# Patient Record
Sex: MALE | Race: WHITE | NOT HISPANIC OR LATINO | Employment: OTHER | URBAN - METROPOLITAN AREA
[De-identification: names, ages, dates, MRNs, and addresses within clinical notes are randomized per-mention and may not be internally consistent; named-entity substitution may affect disease eponyms.]

---

## 2021-01-24 DIAGNOSIS — Z11.59 ENCOUNTER FOR SCREENING FOR OTHER VIRAL DISEASES: Primary | ICD-10-CM

## 2021-02-04 ENCOUNTER — ANESTHESIA EVENT (OUTPATIENT)
Dept: SURGERY | Facility: CLINIC | Age: 71
End: 2021-02-04
Payer: COMMERCIAL

## 2021-02-04 RX ORDER — SILDENAFIL CITRATE 20 MG/1
20 TABLET ORAL DAILY PRN
COMMUNITY

## 2021-02-04 RX ORDER — FINASTERIDE 1 MG/1
1 TABLET, FILM COATED ORAL DAILY
COMMUNITY

## 2021-02-04 RX ORDER — ROSUVASTATIN CALCIUM 10 MG/1
10 TABLET, COATED ORAL DAILY
COMMUNITY

## 2021-02-04 NOTE — ANESTHESIA PREPROCEDURE EVALUATION
Anesthesia Pre-Procedure Evaluation    Patient: Juan Morrissey   MRN: [id number] : 1950        Preoperative Diagnosis: Primary osteoarthritis of right hip [M16.11]   Procedure : Procedure(s):  RIGHT TOTAL HIP ARTHROPLASTY     No past medical history on file.   No past surgical history on file.   Not on File   Social History     Tobacco Use     Smoking status: Not on file   Substance Use Topics     Alcohol use: Not on file      Wt Readings from Last 1 Encounters:   No data found for Wt        Anesthesia Evaluation   Pt has had prior anesthetic. Type: General.    No history of anesthetic complications       ROS/MED HX  ENT/Pulmonary:       Neurologic:       Cardiovascular:     (+) Dyslipidemia -----    METS/Exercise Tolerance:     Hematologic:       Musculoskeletal:   (+) arthritis,     GI/Hepatic:     (+) GERD,     Renal/Genitourinary:       Endo: Comment: Psoriasis      Psychiatric/Substance Use:       Infectious Disease:       Malignancy:   (+) Malignancy (Hx of melanoma), History of Skin.    Other:            Physical Exam    Airway        Mallampati: I   TM distance: > 3 FB   Neck ROM: full   Mouth opening: > 3 cm    Respiratory Devices and Support         Dental  no notable dental history         Cardiovascular             Pulmonary                   OUTSIDE LABS:  CBC: No results found for: WBC, HGB, HCT, PLT  BMP: No results found for: NA, POTASSIUM, CHLORIDE, CO2, BUN, CR, GLC  COAGS: No results found for: PTT, INR, FIBR  POC: No results found for: BGM, HCG, HCGS  HEPATIC: No results found for: ALBUMIN, PROTTOTAL, ALT, AST, GGT, ALKPHOS, BILITOTAL, BILIDIRECT, KENTRELL  OTHER: No results found for: PH, LACT, A1C, JONI, PHOS, MAG, LIPASE, AMYLASE, TSH, T4, T3, CRP, SED    Anesthesia Plan    ASA Status:  2      Anesthesia Type: General              Consents    Anesthesia Plan(s) and associated risks, benefits, and realistic alternatives discussed. Questions answered and patient/representative(s) expressed  understanding.     - Discussed with:  Patient         Postoperative Care    Pain management: IV analgesics, Multi-modal analgesia.   PONV prophylaxis: Ondansetron (or other 5HT-3)     Comments:                Deven Ly MD

## 2021-02-04 NOTE — PROGRESS NOTES
PTA medications updated by Medication Scribe prior to surgery via phone call with patient      -LAST DOSES ENTERED BY NURSE-    Comments:    Medication history sources: Patient, Surescripts and H&P  Medication history source reliability: Good  Adherence assessment: N/A Not Observed    Significant changes made to the medication list:  Patient reports no longer taking the following meds (med scribe removed from PTA med list): Xiidra, Ibuprofen      Additional medication history information:   None        Prior to Admission medications    Medication Sig Last Dose Taking? Auth Provider   B Complex Vitamins (B COMPLEX PO) Take 1 tablet by mouth daily  2/3/2021 at AM Yes Reported, Patient   finasteride (PROPECIA) 1 MG tablet Take 1 mg by mouth daily  Yes Reported, Patient   Omega-3 Fatty Acids (OMEGA 3 PO) Take 1 Dose by mouth daily 2/2/2021 Yes Reported, Patient   rosuvastatin (CRESTOR) 10 MG tablet Take 10 mg by mouth daily  Yes Reported, Patient   sildenafil (REVATIO) 20 MG tablet Take 20 mg by mouth daily as needed  Yes Reported, Patient   VITAMIN D PO Take 1 Dose by mouth daily 2/2/2021 Yes Reported, Patient   VITAMIN E PO Take 1 tablet by mouth daily 2/3/2021 Yes Reported, Patient

## 2021-02-05 ENCOUNTER — APPOINTMENT (OUTPATIENT)
Dept: PHYSICAL THERAPY | Facility: CLINIC | Age: 71
End: 2021-02-05
Attending: ORTHOPAEDIC SURGERY
Payer: COMMERCIAL

## 2021-02-05 ENCOUNTER — APPOINTMENT (OUTPATIENT)
Dept: GENERAL RADIOLOGY | Facility: CLINIC | Age: 71
End: 2021-02-05
Attending: ORTHOPAEDIC SURGERY
Payer: COMMERCIAL

## 2021-02-05 ENCOUNTER — ANESTHESIA (OUTPATIENT)
Dept: SURGERY | Facility: CLINIC | Age: 71
End: 2021-02-05
Payer: COMMERCIAL

## 2021-02-05 ENCOUNTER — HOSPITAL ENCOUNTER (OUTPATIENT)
Facility: CLINIC | Age: 71
Discharge: HOME | End: 2021-02-06
Attending: ORTHOPAEDIC SURGERY | Admitting: ORTHOPAEDIC SURGERY
Payer: COMMERCIAL

## 2021-02-05 DIAGNOSIS — Z96.641 HISTORY OF TOTAL RIGHT HIP REPLACEMENT: Primary | ICD-10-CM

## 2021-02-05 PROBLEM — Z96.649 HISTORY OF TOTAL HIP REPLACEMENT: Status: ACTIVE | Noted: 2021-02-05

## 2021-02-05 LAB
ABO + RH BLD: NORMAL
ABO + RH BLD: NORMAL
BLD GP AB SCN SERPL QL: NORMAL
BLOOD BANK CMNT PATIENT-IMP: NORMAL
HGB BLD-MCNC: 15.7 G/DL (ref 13.3–17.7)
SPECIMEN EXP DATE BLD: NORMAL

## 2021-02-05 PROCEDURE — 999N000141 HC STATISTIC PRE-PROCEDURE NURSING ASSESSMENT: Performed by: ORTHOPAEDIC SURGERY

## 2021-02-05 PROCEDURE — 250N000011 HC RX IP 250 OP 636: Performed by: NURSE ANESTHETIST, CERTIFIED REGISTERED

## 2021-02-05 PROCEDURE — 93010 ELECTROCARDIOGRAM REPORT: CPT | Performed by: INTERNAL MEDICINE

## 2021-02-05 PROCEDURE — 250N000011 HC RX IP 250 OP 636: Performed by: ORTHOPAEDIC SURGERY

## 2021-02-05 PROCEDURE — 97530 THERAPEUTIC ACTIVITIES: CPT | Mod: GP

## 2021-02-05 PROCEDURE — 93005 ELECTROCARDIOGRAM TRACING: CPT

## 2021-02-05 PROCEDURE — 250N000009 HC RX 250: Performed by: ORTHOPAEDIC SURGERY

## 2021-02-05 PROCEDURE — C1713 ANCHOR/SCREW BN/BN,TIS/BN: HCPCS | Performed by: ORTHOPAEDIC SURGERY

## 2021-02-05 PROCEDURE — 999N000063 XR PELVIS PORT 1/2 VW

## 2021-02-05 PROCEDURE — 258N000003 HC RX IP 258 OP 636: Performed by: PHYSICIAN ASSISTANT

## 2021-02-05 PROCEDURE — 258N000003 HC RX IP 258 OP 636: Performed by: ANESTHESIOLOGY

## 2021-02-05 PROCEDURE — 258N000003 HC RX IP 258 OP 636: Performed by: ORTHOPAEDIC SURGERY

## 2021-02-05 PROCEDURE — 36415 COLL VENOUS BLD VENIPUNCTURE: CPT | Performed by: ANESTHESIOLOGY

## 2021-02-05 PROCEDURE — C1776 JOINT DEVICE (IMPLANTABLE): HCPCS | Performed by: ORTHOPAEDIC SURGERY

## 2021-02-05 PROCEDURE — 272N000001 HC OR GENERAL SUPPLY STERILE: Performed by: ORTHOPAEDIC SURGERY

## 2021-02-05 PROCEDURE — 258N000003 HC RX IP 258 OP 636: Performed by: NURSE ANESTHETIST, CERTIFIED REGISTERED

## 2021-02-05 PROCEDURE — 86850 RBC ANTIBODY SCREEN: CPT | Performed by: ANESTHESIOLOGY

## 2021-02-05 PROCEDURE — 360N000077 HC SURGERY LEVEL 4, PER MIN: Performed by: ORTHOPAEDIC SURGERY

## 2021-02-05 PROCEDURE — 86901 BLOOD TYPING SEROLOGIC RH(D): CPT | Performed by: ANESTHESIOLOGY

## 2021-02-05 PROCEDURE — 250N000009 HC RX 250: Performed by: ANESTHESIOLOGY

## 2021-02-05 PROCEDURE — 250N000025 HC SEVOFLURANE, PER MIN: Performed by: ORTHOPAEDIC SURGERY

## 2021-02-05 PROCEDURE — 97116 GAIT TRAINING THERAPY: CPT | Mod: GP

## 2021-02-05 PROCEDURE — 250N000011 HC RX IP 250 OP 636: Performed by: PHYSICIAN ASSISTANT

## 2021-02-05 PROCEDURE — 258N000001 HC RX 258: Performed by: ORTHOPAEDIC SURGERY

## 2021-02-05 PROCEDURE — 370N000017 HC ANESTHESIA TECHNICAL FEE, PER MIN: Performed by: ORTHOPAEDIC SURGERY

## 2021-02-05 PROCEDURE — 86900 BLOOD TYPING SEROLOGIC ABO: CPT | Performed by: ANESTHESIOLOGY

## 2021-02-05 PROCEDURE — 999N000054 HC STATISTIC EKG NON-CHARGEABLE

## 2021-02-05 PROCEDURE — 250N000013 HC RX MED GY IP 250 OP 250 PS 637: Performed by: PHYSICIAN ASSISTANT

## 2021-02-05 PROCEDURE — 250N000009 HC RX 250: Performed by: NURSE ANESTHETIST, CERTIFIED REGISTERED

## 2021-02-05 PROCEDURE — 710N000009 HC RECOVERY PHASE 1, LEVEL 1, PER MIN: Performed by: ORTHOPAEDIC SURGERY

## 2021-02-05 PROCEDURE — 85018 HEMOGLOBIN: CPT | Performed by: ANESTHESIOLOGY

## 2021-02-05 PROCEDURE — 97161 PT EVAL LOW COMPLEX 20 MIN: CPT | Mod: GP

## 2021-02-05 PROCEDURE — 250N000011 HC RX IP 250 OP 636: Performed by: ANESTHESIOLOGY

## 2021-02-05 DEVICE — PINNACLE HIP SOLUTIONS ALTRX POLYETHYLENE ACETABULAR LINER +4 NEUTRAL 36MM ID 54MM OD
Type: IMPLANTABLE DEVICE | Site: HIP | Status: FUNCTIONAL
Brand: PINNACLE ALTRX

## 2021-02-05 DEVICE — IMP APEX HOLE ELIMINATOR HIP DEPUY DURALOC 1246-03-000: Type: IMPLANTABLE DEVICE | Site: HIP | Status: FUNCTIONAL

## 2021-02-05 DEVICE — BIOLOX DELTA CERAMIC FEMORAL HEAD +5.0 36MM DIA 12/14 TAPER
Type: IMPLANTABLE DEVICE | Site: HIP | Status: FUNCTIONAL
Brand: BIOLOX DELTA

## 2021-02-05 DEVICE — PINNACLE CANCELLOUS BONE SCREW 6.5MM X 25MM
Type: IMPLANTABLE DEVICE | Site: HIP | Status: FUNCTIONAL
Brand: PINNACLE

## 2021-02-05 DEVICE — PINNACLE POROCOAT ACETABULAR SHELL SECTOR II 54MM OD
Type: IMPLANTABLE DEVICE | Site: HIP | Status: FUNCTIONAL
Brand: PINNACLE POROCOAT

## 2021-02-05 DEVICE — TRI-LOCK BPS FEMORAL STEM 12/14 TAPER TRI-LOCK BPS W/GRIPTION SIZE 6 STD 107MM
Type: IMPLANTABLE DEVICE | Site: HIP | Status: FUNCTIONAL
Brand: TRI-LOCK GRIPTION

## 2021-02-05 DEVICE — PINNACLE CANCELLOUS BONE SCREW 6.5MM X 20MM
Type: IMPLANTABLE DEVICE | Site: HIP | Status: FUNCTIONAL
Brand: PINNACLE

## 2021-02-05 RX ORDER — FENTANYL CITRATE 50 UG/ML
INJECTION, SOLUTION INTRAMUSCULAR; INTRAVENOUS PRN
Status: DISCONTINUED | OUTPATIENT
Start: 2021-02-05 | End: 2021-02-05

## 2021-02-05 RX ORDER — AMOXICILLIN 250 MG
1-2 CAPSULE ORAL 2 TIMES DAILY
Qty: 30 TABLET | Refills: 0 | Status: SHIPPED | OUTPATIENT
Start: 2021-02-05 | End: 2021-08-25

## 2021-02-05 RX ORDER — OXYCODONE HYDROCHLORIDE 5 MG/1
5 TABLET ORAL
Status: DISCONTINUED | OUTPATIENT
Start: 2021-02-05 | End: 2021-02-06 | Stop reason: HOSPADM

## 2021-02-05 RX ORDER — NALOXONE HYDROCHLORIDE 0.4 MG/ML
0.4 INJECTION, SOLUTION INTRAMUSCULAR; INTRAVENOUS; SUBCUTANEOUS
Status: DISCONTINUED | OUTPATIENT
Start: 2021-02-05 | End: 2021-02-06 | Stop reason: HOSPADM

## 2021-02-05 RX ORDER — HYDROMORPHONE HYDROCHLORIDE 1 MG/ML
.3-.5 INJECTION, SOLUTION INTRAMUSCULAR; INTRAVENOUS; SUBCUTANEOUS EVERY 5 MIN PRN
Status: DISCONTINUED | OUTPATIENT
Start: 2021-02-05 | End: 2021-02-05 | Stop reason: HOSPADM

## 2021-02-05 RX ORDER — NALOXONE HYDROCHLORIDE 0.4 MG/ML
0.2 INJECTION, SOLUTION INTRAMUSCULAR; INTRAVENOUS; SUBCUTANEOUS
Status: DISCONTINUED | OUTPATIENT
Start: 2021-02-05 | End: 2021-02-06 | Stop reason: HOSPADM

## 2021-02-05 RX ORDER — ONDANSETRON 2 MG/ML
4 INJECTION INTRAMUSCULAR; INTRAVENOUS EVERY 6 HOURS PRN
Status: DISCONTINUED | OUTPATIENT
Start: 2021-02-05 | End: 2021-02-06 | Stop reason: HOSPADM

## 2021-02-05 RX ORDER — PROPOFOL 10 MG/ML
INJECTION, EMULSION INTRAVENOUS PRN
Status: DISCONTINUED | OUTPATIENT
Start: 2021-02-05 | End: 2021-02-05

## 2021-02-05 RX ORDER — SODIUM CHLORIDE, SODIUM LACTATE, POTASSIUM CHLORIDE, CALCIUM CHLORIDE 600; 310; 30; 20 MG/100ML; MG/100ML; MG/100ML; MG/100ML
INJECTION, SOLUTION INTRAVENOUS CONTINUOUS
Status: DISCONTINUED | OUTPATIENT
Start: 2021-02-05 | End: 2021-02-05 | Stop reason: HOSPADM

## 2021-02-05 RX ORDER — ONDANSETRON 4 MG/1
4 TABLET, ORALLY DISINTEGRATING ORAL EVERY 6 HOURS PRN
Status: DISCONTINUED | OUTPATIENT
Start: 2021-02-05 | End: 2021-02-06 | Stop reason: HOSPADM

## 2021-02-05 RX ORDER — HYDROXYZINE HYDROCHLORIDE 10 MG/1
10 TABLET, FILM COATED ORAL EVERY 6 HOURS PRN
Qty: 30 TABLET | Refills: 0 | Status: SHIPPED | OUTPATIENT
Start: 2021-02-05

## 2021-02-05 RX ORDER — SILDENAFIL CITRATE 20 MG/1
20 TABLET ORAL 2 TIMES DAILY
Status: DISCONTINUED | OUTPATIENT
Start: 2021-02-05 | End: 2021-02-05 | Stop reason: CLARIF

## 2021-02-05 RX ORDER — NEOSTIGMINE METHYLSULFATE 1 MG/ML
VIAL (ML) INJECTION PRN
Status: DISCONTINUED | OUTPATIENT
Start: 2021-02-05 | End: 2021-02-05

## 2021-02-05 RX ORDER — HYDROXYZINE HYDROCHLORIDE 50 MG/ML
50 INJECTION, SOLUTION INTRAMUSCULAR ONCE
Status: COMPLETED | OUTPATIENT
Start: 2021-02-05 | End: 2021-02-05

## 2021-02-05 RX ORDER — LIDOCAINE HYDROCHLORIDE 20 MG/ML
INJECTION, SOLUTION INFILTRATION; PERINEURAL PRN
Status: DISCONTINUED | OUTPATIENT
Start: 2021-02-05 | End: 2021-02-05

## 2021-02-05 RX ORDER — HYDROMORPHONE HYDROCHLORIDE 1 MG/ML
0.4 INJECTION, SOLUTION INTRAMUSCULAR; INTRAVENOUS; SUBCUTANEOUS
Status: DISCONTINUED | OUTPATIENT
Start: 2021-02-05 | End: 2021-02-06 | Stop reason: HOSPADM

## 2021-02-05 RX ORDER — GLYCOPYRROLATE 0.2 MG/ML
INJECTION, SOLUTION INTRAMUSCULAR; INTRAVENOUS PRN
Status: DISCONTINUED | OUTPATIENT
Start: 2021-02-05 | End: 2021-02-05

## 2021-02-05 RX ORDER — LIDOCAINE 40 MG/G
CREAM TOPICAL
Status: DISCONTINUED | OUTPATIENT
Start: 2021-02-05 | End: 2021-02-06 | Stop reason: HOSPADM

## 2021-02-05 RX ORDER — ONDANSETRON 2 MG/ML
INJECTION INTRAMUSCULAR; INTRAVENOUS PRN
Status: DISCONTINUED | OUTPATIENT
Start: 2021-02-05 | End: 2021-02-05

## 2021-02-05 RX ORDER — AMOXICILLIN 250 MG
1 CAPSULE ORAL 2 TIMES DAILY
Status: DISCONTINUED | OUTPATIENT
Start: 2021-02-05 | End: 2021-02-06 | Stop reason: HOSPADM

## 2021-02-05 RX ORDER — CEFAZOLIN SODIUM 2 G/100ML
2 INJECTION, SOLUTION INTRAVENOUS EVERY 8 HOURS
Status: COMPLETED | OUTPATIENT
Start: 2021-02-05 | End: 2021-02-06

## 2021-02-05 RX ORDER — ACETAMINOPHEN 325 MG/1
975 TABLET ORAL EVERY 8 HOURS
Status: DISCONTINUED | OUTPATIENT
Start: 2021-02-05 | End: 2021-02-06 | Stop reason: HOSPADM

## 2021-02-05 RX ORDER — OXYCODONE HYDROCHLORIDE 5 MG/1
5-10 TABLET ORAL
Qty: 30 TABLET | Refills: 0 | Status: SHIPPED | OUTPATIENT
Start: 2021-02-05 | End: 2021-08-25

## 2021-02-05 RX ORDER — ONDANSETRON 2 MG/ML
4 INJECTION INTRAMUSCULAR; INTRAVENOUS EVERY 30 MIN PRN
Status: DISCONTINUED | OUTPATIENT
Start: 2021-02-05 | End: 2021-02-05 | Stop reason: HOSPADM

## 2021-02-05 RX ORDER — ACETAMINOPHEN 325 MG/1
650 TABLET ORAL EVERY 4 HOURS PRN
Qty: 100 TABLET | Refills: 0 | Status: SHIPPED | OUTPATIENT
Start: 2021-02-05 | End: 2021-08-25

## 2021-02-05 RX ORDER — BISACODYL 10 MG
10 SUPPOSITORY, RECTAL RECTAL DAILY PRN
Status: DISCONTINUED | OUTPATIENT
Start: 2021-02-05 | End: 2021-02-06 | Stop reason: HOSPADM

## 2021-02-05 RX ORDER — ONDANSETRON 4 MG/1
4 TABLET, ORALLY DISINTEGRATING ORAL EVERY 30 MIN PRN
Status: DISCONTINUED | OUTPATIENT
Start: 2021-02-05 | End: 2021-02-05 | Stop reason: HOSPADM

## 2021-02-05 RX ORDER — FENTANYL CITRATE 50 UG/ML
25-50 INJECTION, SOLUTION INTRAMUSCULAR; INTRAVENOUS
Status: DISCONTINUED | OUTPATIENT
Start: 2021-02-05 | End: 2021-02-05 | Stop reason: HOSPADM

## 2021-02-05 RX ORDER — CEFAZOLIN SODIUM 2 G/100ML
2 INJECTION, SOLUTION INTRAVENOUS
Status: COMPLETED | OUTPATIENT
Start: 2021-02-05 | End: 2021-02-05

## 2021-02-05 RX ORDER — ROSUVASTATIN CALCIUM 10 MG/1
10 TABLET, COATED ORAL DAILY
Status: DISCONTINUED | OUTPATIENT
Start: 2021-02-05 | End: 2021-02-06 | Stop reason: HOSPADM

## 2021-02-05 RX ORDER — DOCUSATE SODIUM 100 MG/1
100 CAPSULE, LIQUID FILLED ORAL 2 TIMES DAILY
Status: DISCONTINUED | OUTPATIENT
Start: 2021-02-05 | End: 2021-02-06 | Stop reason: HOSPADM

## 2021-02-05 RX ORDER — PROPOFOL 10 MG/ML
INJECTION, EMULSION INTRAVENOUS CONTINUOUS PRN
Status: DISCONTINUED | OUTPATIENT
Start: 2021-02-05 | End: 2021-02-05

## 2021-02-05 RX ORDER — ACETAMINOPHEN 325 MG/1
650 TABLET ORAL EVERY 4 HOURS PRN
Status: DISCONTINUED | OUTPATIENT
Start: 2021-02-08 | End: 2021-02-06 | Stop reason: HOSPADM

## 2021-02-05 RX ORDER — CEFAZOLIN SODIUM 1 G/3ML
1 INJECTION, POWDER, FOR SOLUTION INTRAMUSCULAR; INTRAVENOUS SEE ADMIN INSTRUCTIONS
Status: DISCONTINUED | OUTPATIENT
Start: 2021-02-05 | End: 2021-02-05 | Stop reason: HOSPADM

## 2021-02-05 RX ORDER — KETOROLAC TROMETHAMINE 30 MG/ML
30 INJECTION, SOLUTION INTRAMUSCULAR; INTRAVENOUS ONCE
Status: COMPLETED | OUTPATIENT
Start: 2021-02-05 | End: 2021-02-05

## 2021-02-05 RX ORDER — POLYETHYLENE GLYCOL 3350 17 G/17G
17 POWDER, FOR SOLUTION ORAL DAILY
Status: DISCONTINUED | OUTPATIENT
Start: 2021-02-06 | End: 2021-02-06 | Stop reason: HOSPADM

## 2021-02-05 RX ORDER — SODIUM CHLORIDE, SODIUM LACTATE, POTASSIUM CHLORIDE, CALCIUM CHLORIDE 600; 310; 30; 20 MG/100ML; MG/100ML; MG/100ML; MG/100ML
INJECTION, SOLUTION INTRAVENOUS CONTINUOUS
Status: DISCONTINUED | OUTPATIENT
Start: 2021-02-05 | End: 2021-02-06 | Stop reason: HOSPADM

## 2021-02-05 RX ORDER — PROCHLORPERAZINE MALEATE 5 MG
5 TABLET ORAL EVERY 6 HOURS PRN
Status: DISCONTINUED | OUTPATIENT
Start: 2021-02-05 | End: 2021-02-06 | Stop reason: HOSPADM

## 2021-02-05 RX ORDER — HYDROMORPHONE HYDROCHLORIDE 1 MG/ML
0.2 INJECTION, SOLUTION INTRAMUSCULAR; INTRAVENOUS; SUBCUTANEOUS
Status: DISCONTINUED | OUTPATIENT
Start: 2021-02-05 | End: 2021-02-06 | Stop reason: HOSPADM

## 2021-02-05 RX ORDER — DEXAMETHASONE SODIUM PHOSPHATE 4 MG/ML
INJECTION, SOLUTION INTRA-ARTICULAR; INTRALESIONAL; INTRAMUSCULAR; INTRAVENOUS; SOFT TISSUE PRN
Status: DISCONTINUED | OUTPATIENT
Start: 2021-02-05 | End: 2021-02-05

## 2021-02-05 RX ORDER — OXYCODONE HYDROCHLORIDE 5 MG/1
10 TABLET ORAL EVERY 4 HOURS PRN
Status: DISCONTINUED | OUTPATIENT
Start: 2021-02-05 | End: 2021-02-06 | Stop reason: HOSPADM

## 2021-02-05 RX ORDER — EPHEDRINE SULFATE 50 MG/ML
INJECTION, SOLUTION INTRAMUSCULAR; INTRAVENOUS; SUBCUTANEOUS PRN
Status: DISCONTINUED | OUTPATIENT
Start: 2021-02-05 | End: 2021-02-05

## 2021-02-05 RX ORDER — HYDROXYZINE HYDROCHLORIDE 10 MG/1
10 TABLET, FILM COATED ORAL EVERY 6 HOURS PRN
Status: DISCONTINUED | OUTPATIENT
Start: 2021-02-05 | End: 2021-02-06 | Stop reason: HOSPADM

## 2021-02-05 RX ORDER — TRANEXAMIC ACID 650 MG/1
1950 TABLET ORAL ONCE
Status: COMPLETED | OUTPATIENT
Start: 2021-02-05 | End: 2021-02-05

## 2021-02-05 RX ORDER — MAGNESIUM HYDROXIDE 1200 MG/15ML
LIQUID ORAL PRN
Status: DISCONTINUED | OUTPATIENT
Start: 2021-02-05 | End: 2021-02-05 | Stop reason: HOSPADM

## 2021-02-05 RX ORDER — FINASTERIDE 1 MG/1
1 TABLET, FILM COATED ORAL DAILY
Status: DISCONTINUED | OUTPATIENT
Start: 2021-02-05 | End: 2021-02-05 | Stop reason: CLARIF

## 2021-02-05 RX ADMIN — CEFAZOLIN SODIUM 2 G: 2 INJECTION, SOLUTION INTRAVENOUS at 16:38

## 2021-02-05 RX ADMIN — DOCUSATE SODIUM 100 MG: 100 CAPSULE, LIQUID FILLED ORAL at 20:01

## 2021-02-05 RX ADMIN — SODIUM CHLORIDE, POTASSIUM CHLORIDE, SODIUM LACTATE AND CALCIUM CHLORIDE: 600; 310; 30; 20 INJECTION, SOLUTION INTRAVENOUS at 06:48

## 2021-02-05 RX ADMIN — HYDROMORPHONE HYDROCHLORIDE 0.5 MG: 1 INJECTION, SOLUTION INTRAMUSCULAR; INTRAVENOUS; SUBCUTANEOUS at 08:01

## 2021-02-05 RX ADMIN — CEFAZOLIN SODIUM 2 G: 2 INJECTION, SOLUTION INTRAVENOUS at 07:48

## 2021-02-05 RX ADMIN — SODIUM CHLORIDE, POTASSIUM CHLORIDE, SODIUM LACTATE AND CALCIUM CHLORIDE: 600; 310; 30; 20 INJECTION, SOLUTION INTRAVENOUS at 18:30

## 2021-02-05 RX ADMIN — DOCUSATE SODIUM 50 MG AND SENNOSIDES 8.6 MG 1 TABLET: 8.6; 5 TABLET, FILM COATED ORAL at 20:01

## 2021-02-05 RX ADMIN — HYDROMORPHONE HYDROCHLORIDE 0.5 MG: 1 INJECTION, SOLUTION INTRAMUSCULAR; INTRAVENOUS; SUBCUTANEOUS at 08:20

## 2021-02-05 RX ADMIN — OXYCODONE HYDROCHLORIDE 5 MG: 5 TABLET ORAL at 18:30

## 2021-02-05 RX ADMIN — OXYCODONE HYDROCHLORIDE 10 MG: 5 TABLET ORAL at 21:29

## 2021-02-05 RX ADMIN — PROPOFOL 200 MG: 10 INJECTION, EMULSION INTRAVENOUS at 07:36

## 2021-02-05 RX ADMIN — HYDROMORPHONE HYDROCHLORIDE 0.5 MG: 1 INJECTION, SOLUTION INTRAMUSCULAR; INTRAVENOUS; SUBCUTANEOUS at 11:07

## 2021-02-05 RX ADMIN — HYDROXYZINE HYDROCHLORIDE 50 MG: 50 INJECTION, SOLUTION INTRAMUSCULAR at 11:52

## 2021-02-05 RX ADMIN — ROCURONIUM BROMIDE 10 MG: 10 INJECTION INTRAVENOUS at 08:49

## 2021-02-05 RX ADMIN — NEOSTIGMINE METHYLSULFATE 5 MG: 1 INJECTION, SOLUTION INTRAVENOUS at 09:51

## 2021-02-05 RX ADMIN — HYDROMORPHONE HYDROCHLORIDE 0.5 MG: 1 INJECTION, SOLUTION INTRAMUSCULAR; INTRAVENOUS; SUBCUTANEOUS at 10:16

## 2021-02-05 RX ADMIN — DEXAMETHASONE SODIUM PHOSPHATE 4 MG: 4 INJECTION, SOLUTION INTRA-ARTICULAR; INTRALESIONAL; INTRAMUSCULAR; INTRAVENOUS; SOFT TISSUE at 07:51

## 2021-02-05 RX ADMIN — HYDROMORPHONE HYDROCHLORIDE 0.5 MG: 1 INJECTION, SOLUTION INTRAMUSCULAR; INTRAVENOUS; SUBCUTANEOUS at 11:35

## 2021-02-05 RX ADMIN — FENTANYL CITRATE 50 MCG: 50 INJECTION, SOLUTION INTRAMUSCULAR; INTRAVENOUS at 07:46

## 2021-02-05 RX ADMIN — LIDOCAINE HYDROCHLORIDE 100 MG: 20 INJECTION, SOLUTION INFILTRATION; PERINEURAL at 07:34

## 2021-02-05 RX ADMIN — CEFAZOLIN SODIUM 1 G: 2 INJECTION, SOLUTION INTRAVENOUS at 09:44

## 2021-02-05 RX ADMIN — ASPIRIN 325 MG: 325 TABLET, COATED ORAL at 16:38

## 2021-02-05 RX ADMIN — SODIUM CHLORIDE, POTASSIUM CHLORIDE, SODIUM LACTATE AND CALCIUM CHLORIDE: 600; 310; 30; 20 INJECTION, SOLUTION INTRAVENOUS at 08:43

## 2021-02-05 RX ADMIN — HYDROMORPHONE HYDROCHLORIDE 0.5 MG: 1 INJECTION, SOLUTION INTRAMUSCULAR; INTRAVENOUS; SUBCUTANEOUS at 10:52

## 2021-02-05 RX ADMIN — KETOROLAC TROMETHAMINE 30 MG: 30 INJECTION, SOLUTION INTRAMUSCULAR at 11:18

## 2021-02-05 RX ADMIN — SODIUM CHLORIDE, POTASSIUM CHLORIDE, SODIUM LACTATE AND CALCIUM CHLORIDE: 600; 310; 30; 20 INJECTION, SOLUTION INTRAVENOUS at 16:39

## 2021-02-05 RX ADMIN — Medication 5 MG: at 09:23

## 2021-02-05 RX ADMIN — GLYCOPYRROLATE 0.8 MG: 0.2 INJECTION, SOLUTION INTRAMUSCULAR; INTRAVENOUS at 09:51

## 2021-02-05 RX ADMIN — DEXMEDETOMIDINE HYDROCHLORIDE 20 MCG: 100 INJECTION, SOLUTION INTRAVENOUS at 08:27

## 2021-02-05 RX ADMIN — TRANEXAMIC ACID 1950 MG: 650 TABLET ORAL at 06:09

## 2021-02-05 RX ADMIN — PROPOFOL 30 MCG/KG/MIN: 10 INJECTION, EMULSION INTRAVENOUS at 08:06

## 2021-02-05 RX ADMIN — LIDOCAINE HYDROCHLORIDE: 10 INJECTION, SOLUTION EPIDURAL; INFILTRATION; INTRACAUDAL; PERINEURAL at 06:46

## 2021-02-05 RX ADMIN — ROCURONIUM BROMIDE 20 MG: 10 INJECTION INTRAVENOUS at 08:11

## 2021-02-05 RX ADMIN — ROCURONIUM BROMIDE 50 MG: 10 INJECTION INTRAVENOUS at 07:36

## 2021-02-05 RX ADMIN — ACETAMINOPHEN 975 MG: 325 TABLET, FILM COATED ORAL at 20:01

## 2021-02-05 RX ADMIN — ONDANSETRON 4 MG: 2 INJECTION INTRAMUSCULAR; INTRAVENOUS at 09:21

## 2021-02-05 RX ADMIN — MIDAZOLAM 2 MG: 1 INJECTION INTRAMUSCULAR; INTRAVENOUS at 07:28

## 2021-02-05 RX ADMIN — FENTANYL CITRATE 50 MCG: 50 INJECTION, SOLUTION INTRAMUSCULAR; INTRAVENOUS at 07:34

## 2021-02-05 RX ADMIN — Medication 5 MG: at 08:53

## 2021-02-05 RX ADMIN — FENTANYL CITRATE 50 MCG: 0.05 INJECTION, SOLUTION INTRAMUSCULAR; INTRAVENOUS at 10:29

## 2021-02-05 RX ADMIN — PHENYLEPHRINE HYDROCHLORIDE 100 MCG: 10 INJECTION INTRAVENOUS at 08:01

## 2021-02-05 ASSESSMENT — MIFFLIN-ST. JEOR: SCORE: 1687.98

## 2021-02-05 NOTE — OP NOTE
Procedure Date: 02/05/2021      PREOPERATIVE DIAGNOSIS:  Osteoarthritis of the right hip.      POSTOPERATIVE DIAGNOSIS:  Osteoarthritis of the right hip.      PROCEDURE PERFORMED:  Right total hip arthroplasty using the DePuy Tri-Lock stem size 6 standard offset and a 54 mm Sylvester cup with a +4 neutral liner for a 54 mm cup and a 36 mm head.  We used a +5, 36 mm ceramic head.      INDICATIONS FOR PROCEDURE:  The patient is a 70-year-old male with a long history of osteoarthritis of the right hip.  He had been getting injection treatments with good short-term relief; however, they became decreasingly effective.  I saw the patient in consultation at which time I discussed treatment options with him.  I explained to him the risks, benefits, potential complications of both operative and nonoperative treatment.  The discussion of surgery including but not specific to infection, vascular neurologic complications, DVT, leg length inequality, instability of the hip, fracture, septic and aseptic loosening, arthrofibrosis, heterotopic ossification, the possible need for further revision surgery.  After this discussion, the patient wanted to proceed with surgery.      ANESTHESIA:  General.      ASSISTANT:  KYLEE TALLEY PA-C      DESCRIPTION OF PROCEDURE:  The patient was taken to the operating room and placed on the operating table in the supine position.  After adequate induction of general anesthetic, he was transferred to lateral position and held this way with a [place] hip nicole, held with his right hip up.  Axillary roll was placed.  The patient was given 2 grams of Ancef for infection prophylaxis given 1 hour prior to incision.  We then performed sterile prep and drape of the right hip and right lower extremity.  After sterile prep and drape, an incision was made.  I proceeded with anterolateral approach to the hip.  I identified the abductors, took down the anterior one-third of the abductors, tagged and retracted  medially for later repair.  I then did a T capsulotomy, preserving the capsule for later repair.  We were then able to dislocate the hip.  Upon dislocation of the hip, we made appropriate femoral neck cut and applied bone wax to the cut surface of femur to diminish blood loss.  We then placed retractors anteriorly, superiorly and posteriorly to expose the acetabulum.  We then reamed to 53 mm and eventually put in a 54 mm cup.  Prior to placing the cup, we took reamings of the femoral head to use as bone graft.  Once the cup was fully seated, 3 screws were applied for additional fixation.  We then put in a manhole cover, irrigated with pulse jet lavage and put in the actual liner and a +4 neutral liner for a 54 mm cup and a 36 mm head.  We then directed our attention to the femur, used a broach-only technique, broached to a size 6 standard offset stem, reduced the hip and found it to be very stable in full extension, external rotation, flexion, adduction, internal rotation with restoration of leg length and offset.  We then obtained intraoperative AP pelvis x-ray, which demonstrated ideal position of the cup and screws, good fit on the femoral side with restoration of leg length and offset.  We then dislocated the hip, removed the trial components, irrigated with pulse jet lavage and put down the actual stem.  Once the stem was fully seated, we then applied the head and reduced the hip, again found it to be very stable with restoration of leg length and offset.  We then soaked the hip in a dilute solution of Betadine for 3 minutes and irrigated with pulse jet lavage, used approximately 3 liters of antibiotic saline and pulse jet lavage.  We then repaired the capsule using 0 Ethibond.  Abduction repaired using #2 Ethibond.  The fascial layer was closed using 0 Ethibond and was oversewn using 0 Stratafix.  The deep subcutaneous was closed using 0 Vicryl, superficial subcutaneous was closed with 2-0 Vicryl, skin was  closed with a running 3-0 Monocryl subcuticular stitch followed by Prineo dressing.  Sterile dressing was applied followed by abduction pillow.  The patient tolerated the operative procedure.  There were no intraoperative complications.  The patient went to PAR in stable condition.      PLAN:  Will be for the patient to get 24 hours of Ancef for infection prophylaxis, 30 days of aspirin for DVT prophylaxis.         RICARDA HERNANDEZ MD             D: 2021   T: 2021   MT:       Name:     ZAN GARRISON   MRN:      [id number]        Account:        [id number]   :      1950           Procedure Date: 2021      Document: [id number]

## 2021-02-05 NOTE — ANESTHESIA CARE TRANSFER NOTE
Patient: Juan Morrissey    Procedure(s):  RIGHT TOTAL HIP ARTHROPLASTY    Diagnosis: Primary osteoarthritis of right hip [M16.11]  Diagnosis Additional Information: No value filed.    Anesthesia Type:   General     Note:    Oropharynx: oropharynx clear of all foreign objects  Level of Consciousness: drowsy  Oxygen Supplementation: face mask    Independent Airway: airway patency satisfactory and stable  Dentition: dentition unchanged  Vital Signs Stable: post-procedure vital signs reviewed and stable  Report to RN Given: handoff report given  Patient transferred to: PACU    Handoff Report: Identifed the Patient, Identified the Reponsible Provider, Reviewed the pertinent medical history, Discussed the surgical course, Reviewed Intra-OP anesthesia mangement and issues during anesthesia, Set expectations for post-procedure period and Allowed opportunity for questions and acknowledgement of understanding      Vitals: (Last set prior to Anesthesia Care Transfer)  CRNA VITALS  2/5/2021 0932 - 2/5/2021 1008      2/5/2021             Pulse:  103    SpO2:  96 %    Resp Rate (observed):  10        Electronically Signed By: PAPI Banuelos CRNA  February 5, 2021  10:08 AM

## 2021-02-05 NOTE — PROGRESS NOTES
Patient vital signs are at baseline: Yes  Patient able to ambulate as they were prior to admission or with assist devices provided by therapies during their stay:  No,  Reason:  Pt is not OOB yet. Pt is at 1545.  Patient MUST void prior to discharge:  No,  Reason:  DTV  Patient able to tolerate oral intake:  Yes  Pain has adequate pain control using Oral analgesics:  Yes, Rates pain at 5 but says is comfortable. Refused pain medication.

## 2021-02-05 NOTE — BRIEF OP NOTE
[facility]    Brief Operative Note    Pre-operative diagnosis: Primary osteoarthritis of right hip [M16.11]  Post-operative diagnosis Same as pre-operative diagnosis    Procedure: Procedure(s):  RIGHT TOTAL HIP ARTHROPLASTY  Surgeon: Surgeon(s) and Role:     * Desi Cali MD - Primary     * Thomas Barrera PA-C - Assisting  Anesthesia: General   Estimated blood loss: Less than 200 ml  Drains: None  Specimens:   ID Type Source Tests Collected by Time Destination   1 : RIGHT HIP BONE Other (specify in comments) Other OR DOCUMENTATION ONLY Desi Cali MD 2/5/2021  8:14 AM      Findings:   None.  Complications: None.  Implants:   Implant Name Type Inv. Item Serial No.  Lot No. LRB No. Used Action   IMP CUP ACE PINNACLE 54MM 1217- Total Joint Component/Insert IMP CUP ACE PINNACLE 54MM 1217-  [id number] Right 1 Implanted   IMP APEX HOLE ELIMINATOR HIP DEPUY DURALOC 1246- Metallic Hardware/[place] IMP APEX HOLE ELIMINATOR HIP DEPUY DURALOC 1246-  [employer] [id number] Right 1 Implanted   IMP SCR BONE CAN ACE 6.5X25MM 1217- Metallic Hardware/[place] IMP SCR BONE CAN ACE 6.5X25MM 1217-  [employer] [facility]-C [id number] Right 1 Implanted   IMP SCR BONE CAN ACE 6.5X20MM 1217- Metallic Hardware/[place] IMP SCR BONE CAN ACE 6.5X20MM 1217-   [id number] Right 1 Implanted   IMP SCR BONE CAN ACE 6.5X25MM 1217- Metallic Hardware/[place] IMP SCR BONE CAN ACE 6.5X25MM 1217-  [employer] [facility]-C [id number] Right 1 Implanted   IMP LINER HIP DEPUY PINNACLE ALTRX [id number] +4 1221- Total Joint Component/Insert IMP LINER HIP DEPUY PINNACLE ALTRX [id number] +4 1221-  J [id number] Right 1 Implanted   STEM FEMUR TRI-LOCK BPS SZ6 SO Total Joint Component/Insert STEM FEMUR TRI-LOCK BPS SZ6 SO  J [id number] Right 1 Implanted   IMP HEAD FEMORAL DEPUY CERAMIC 36MM +5MM [id number] Total Joint Component/Insert IMP HEAD FEMORAL  DEPUY CERAMIC 36MM +5MM [id number]  [id number] Right 1 Implanted

## 2021-02-05 NOTE — ANESTHESIA PROCEDURE NOTES
Airway   Date/Time: 2/5/2021 7:38 AM   Patient location during procedure: OR  Staff -   Performed By: CRNA    Indications and Patient Condition  Indications for airway management: kirstin-procedural and airway protection  Induction type:intravenousMask difficulty assessment: 2 - vent by mask + OA or adjuvant +/- NMBA    Final Airway Details  Final airway type: endotracheal airway  Successful airway:ETT - single and Oral  Endotracheal Airway Details   ETT size (mm): 8.0  Cuffed: yes  Successful intubation technique: direct laryngoscopy  Grade View of Cords: 1  Adjucts: stylet  Measured from: lips  Secured at (cm): 23  Secured with: pink tape  Bite block used: None    Post intubation assessment   Placement verified by: capnometry   Number of attempts at approach: 1  Number of other approaches attempted: 0  Secured with:pink tape  Ease of procedure: easy  Dentition: Intact and Unchanged

## 2021-02-05 NOTE — DISCHARGE SUMMARY
Orthopedic Discharge Summary   Patient: Juan Morrissey  Admission Date: 2/5/2021  Discharge Date: 2/6/2021  Date of Service: 2/5/2021  Attending Provider: Desi Cali MD  Admission Diagnosis: Primary osteoarthritis of right hip [M16.11]  Discharge Diagnosis: right hip osteoarthritis  Procedures Performed: right total hip replacement  Complications: None apparent   History of Present Illness: see operative report for full HPI  Past Medical History:   Past Medical History:   Diagnosis Date     Arthritis      DJD (degenerative joint disease)      Elevated HDL      GERD (gastroesophageal reflux disease) 2017     Hyperlipidemia, mixed      Melanoma (H) 2017     Psoriasis      Rosacea      There is no problem list on file for this patient.    Past Surgical History:   Procedure Laterality Date     SOFT TISSUE SURGERY      skin graft at age 9     VASCULAR SURGERY      vein stripping     Social History     Socioeconomic History     Marital status:      Spouse name: Not on file     Number of children: Not on file     Years of education: Not on file     Highest education level: Not on file   Occupational History     Not on file   Social Needs     Financial resource strain: Not on file     Food insecurity     Worry: Not on file     Inability: Not on file     Transportation needs     Medical: Not on file     Non-medical: Not on file   Tobacco Use     Smoking status: Former Smoker     Smokeless tobacco: Never Used   Substance and Sexual Activity     Alcohol use: Yes     Comment: 2WINE,4 SHOTS OF LIQUOR/ WEEK     Drug use: Not on file     Sexual activity: Not on file   Lifestyle     Physical activity     Days per week: Not on file     Minutes per session: Not on file     Stress: Not on file   Relationships     Social connections     Talks on phone: Not on file     Gets together: Not on file     Attends Denominational service: Not on file     Active member of club or organization: Not on file     Attends meetings of clubs  or organizations: Not on file     Relationship status: Not on file     Intimate partner violence     Fear of current or ex partner: Not on file     Emotionally abused: Not on file     Physically abused: Not on file     Forced sexual activity: Not on file   Other Topics Concern     Parent/sibling w/ CABG, MI or angioplasty before 65F 55M? Not Asked   Social History Narrative     Not on file     Medications on admission:   Medications Prior to Admission   Medication Sig Dispense Refill Last Dose     B Complex Vitamins (B COMPLEX PO) Take 1 tablet by mouth daily    2/2/2021 at AM     finasteride (PROPECIA) 1 MG tablet Take 1 mg by mouth daily   2/4/2021 at am     Omega-3 Fatty Acids (OMEGA 3 PO) Take 1 Dose by mouth daily   2/2/2021     rosuvastatin (CRESTOR) 10 MG tablet Take 10 mg by mouth daily   2/4/2021 at am     sildenafil (REVATIO) 20 MG tablet Take 20 mg by mouth daily as needed   Past Week at Unknown time     VITAMIN D PO Take 1 Dose by mouth daily   2/2/2021     VITAMIN E PO Take 1 tablet by mouth daily   2/2/2021     Allergies:  No Known Allergies    Hospital Course: Patient was admitted to Orthopedics and brought to the OR on where he underwent the above named procedure. Postoperatively he did very well with no apparent complications, and he had an uneventful hospital stay. Antibiotics were given for 24 hours after surgery. He was given aspirin for DVT prophylaxis and his pain was well controlled with oral meds, then transitioned to oral pain medications during his hospital stay. He progressed well with physical therapy and discharge to home was recommended.   Consultations Obtained During Hospitalization:  2. Physical Therapy for gait training, mobilization, range of motion and strengthening exercises  3. Occupational Therapy for activities of daily living.  4. Social Work for disposition planning.  Active Problems:    * No active hospital problems. *    Discharge Disposition: patient improving  Discharge  Diet: resume normal pre op diet  Discharge Medications:   Current Discharge Medication List      START taking these medications    Details   acetaminophen (TYLENOL) 325 MG tablet Take 2 tablets (650 mg) by mouth every 4 hours as needed for other (mild pain)  Qty: 100 tablet, Refills: 0    Associated Diagnoses: History of total right hip replacement      aspirin (ASA) 325 MG EC tablet Take 1 tablet (325 mg) by mouth daily  Qty: 30 tablet, Refills: 0    Associated Diagnoses: History of total right hip replacement      hydrOXYzine (ATARAX) 10 MG tablet Take 1 tablet (10 mg) by mouth every 6 hours as needed for itching or anxiety (with pain, moderate pain)  Qty: 30 tablet, Refills: 0    Associated Diagnoses: History of total right hip replacement      oxyCODONE (ROXICODONE) 5 MG tablet Take 1-2 tablets (5-10 mg) by mouth every 3 hours as needed for pain (Moderate to Severe)  Qty: 30 tablet, Refills: 0    Associated Diagnoses: History of total right hip replacement      senna-docusate (SENOKOT-S/PERICOLACE) 8.6-50 MG tablet Take 1-2 tablets by mouth 2 times daily Take while on oral narcotics to prevent or treat constipation.  Qty: 30 tablet, Refills: 0    Comments: While taking narcotics  Associated Diagnoses: History of total right hip replacement         CONTINUE these medications which have NOT CHANGED    Details   B Complex Vitamins (B COMPLEX PO) Take 1 tablet by mouth daily       finasteride (PROPECIA) 1 MG tablet Take 1 mg by mouth daily      Omega-3 Fatty Acids (OMEGA 3 PO) Take 1 Dose by mouth daily      rosuvastatin (CRESTOR) 10 MG tablet Take 10 mg by mouth daily      sildenafil (REVATIO) 20 MG tablet Take 20 mg by mouth daily as needed      VITAMIN D PO Take 1 Dose by mouth daily      VITAMIN E PO Take 1 tablet by mouth daily           Code Status:   X-rays needed at the follow up visit:   Thomas Barrera PA-C  2/5/2021 10:18 AM   [facility]  [phone]

## 2021-02-05 NOTE — OR NURSING
MDA in - pain level a 7 - again uncomfortable - to try 0.5 Dilaudid - if no better result with pain level to give Vistaril 50 mg IM - will continue to monitor in PACU for now

## 2021-02-05 NOTE — PROGRESS NOTES
02/05/21 1623   Quick Adds   Type of Visit Initial PT Evaluation   Living Environment   People in home spouse   Current Living Arrangements condominium   Home Accessibility stairs within home;stairs to enter home   Number of Stairs, Main Entrance 4   Stair Railings, Main Entrance railings on both sides of stairs   Number of Stairs, Within Home, Primary   (15)   Stair Railings, Within Home, Primary railing on left side (ascending)   Transportation Anticipated car, drives self   Living Environment Comments pt has two story condo with bedroom on second floor and ability to get out of bed on left side in order to follow precautions   Self-Care   Usual Activity Tolerance good   Current Activity Tolerance good   Activity/Exercise/Self-Care Comment ice ritika   General Information   Onset of Illness/Injury or Date of Surgery 02/05/21   Referring Physician Desi Cali   Patient/Family Therapy Goals Statement (PT) ice amy   Pertinent History of Current Problem (include personal factors and/or comorbidities that impact the POC) R anteriolateral FRANSISCA 2/5/2021   Existing Precautions/Restrictions no active hip ABD;fall   Weight-Bearing Status - RLE weight-bearing as tolerated   Cognition   Orientation Status (Cognition) oriented x 4   Affect/Mental Status (Cognition) WNL   Follows Commands (Cognition) WNL   Pain Assessment   Patient Currently in Pain Yes, see Vital Sign flowsheet  (1/10 in bed 4/10 while walking)   Strength   Strength Comments WFL   Bed Mobility   Comment (Bed Mobility) independent with bed mobility and able to follow precautions   Transfers   Transfer Safety Comments SBA   Gait/Stairs (Locomotion)   [place] Level (Gait) supervision   Assistive Device (Gait) walker, front-wheeled   Distance in Feet (Required for LE Total Joints) 300ft   Pattern (Gait) step-through;3-point   Maintains Weight-bearing Status (Gait) able to maintain   Clinical Impression   Criteria for Skilled Therapeutic Intervention  yes, treatment indicated   PT Diagnosis (PT) impaired mobility   Influenced by the following impairments pain, right hip weakness, right hip decreased ROM., decreased activity tolerance   Functional limitations due to impairments R FRANSISCA 2/5/2021   Clinical Presentation Stable/Uncomplicated   Clinical Presentation Rationale Pt was alert and oriented and agreeable to particapte in PT with ability to follow commands and keep precautions while performing functional mobility with SBA   Clinical Decision Making (Complexity) low complexity   Therapy Frequency (PT) Other (see comments)  (1 follow up visit)   Predicted Duration of Therapy Intervention (days/wks) 2 days   Planned Therapy Interventions (PT) stair training;transfer training;gait training;patient/family education;home exercise program   Risk & Benefits of therapy have been explained evaluation/treatment results reviewed;care plan/treatment goals reviewed;risks/benefits reviewed;current/potential barriers reviewed;participants voiced agreement with care plan;participants included;patient;mother;sibling   PT Discharge Planning    PT Discharge Recommendation (DC Rec) home with outpatient physical therapy   PT Rationale for DC Rec pt is independent with most ADLs but would benefit from Outpatient physical therapy for increasing strength and ROM of right hip   PT Brief overview of current status  bed mobility-SBA transfers-SBA, Gait-SBA   Total Evaluation Time   Total Evaluation Time (Minutes) 10

## 2021-02-06 ENCOUNTER — APPOINTMENT (OUTPATIENT)
Dept: OCCUPATIONAL THERAPY | Facility: CLINIC | Age: 71
End: 2021-02-06
Attending: ORTHOPAEDIC SURGERY
Payer: COMMERCIAL

## 2021-02-06 ENCOUNTER — APPOINTMENT (OUTPATIENT)
Dept: PHYSICAL THERAPY | Facility: CLINIC | Age: 71
End: 2021-02-06
Attending: ORTHOPAEDIC SURGERY
Payer: COMMERCIAL

## 2021-02-06 VITALS
HEART RATE: 65 BPM | OXYGEN SATURATION: 95 % | WEIGHT: 201 LBS | RESPIRATION RATE: 16 BRPM | HEIGHT: 70 IN | TEMPERATURE: 98.2 F | DIASTOLIC BLOOD PRESSURE: 58 MMHG | BODY MASS INDEX: 28.77 KG/M2 | SYSTOLIC BLOOD PRESSURE: 95 MMHG

## 2021-02-06 LAB
GLUCOSE BLDC GLUCOMTR-MCNC: 114 MG/DL (ref 70–99)
HGB BLD-MCNC: 11.8 G/DL (ref 13.3–17.7)
INTERPRETATION ECG - MUSE: NORMAL

## 2021-02-06 PROCEDURE — 97165 OT EVAL LOW COMPLEX 30 MIN: CPT | Mod: GO

## 2021-02-06 PROCEDURE — 97116 GAIT TRAINING THERAPY: CPT | Mod: GP

## 2021-02-06 PROCEDURE — 97530 THERAPEUTIC ACTIVITIES: CPT | Mod: GP

## 2021-02-06 PROCEDURE — 85018 HEMOGLOBIN: CPT | Performed by: PHYSICIAN ASSISTANT

## 2021-02-06 PROCEDURE — 97530 THERAPEUTIC ACTIVITIES: CPT | Mod: GO

## 2021-02-06 PROCEDURE — 36415 COLL VENOUS BLD VENIPUNCTURE: CPT | Performed by: PHYSICIAN ASSISTANT

## 2021-02-06 PROCEDURE — 250N000011 HC RX IP 250 OP 636: Performed by: PHYSICIAN ASSISTANT

## 2021-02-06 PROCEDURE — 97535 SELF CARE MNGMENT TRAINING: CPT | Mod: GO

## 2021-02-06 PROCEDURE — 999N001017 HC STATISTIC GLUCOSE BY METER IP

## 2021-02-06 PROCEDURE — 250N000013 HC RX MED GY IP 250 OP 250 PS 637: Performed by: PHYSICIAN ASSISTANT

## 2021-02-06 RX ADMIN — POLYETHYLENE GLYCOL 3350 17 G: 17 POWDER, FOR SOLUTION ORAL at 08:59

## 2021-02-06 RX ADMIN — ACETAMINOPHEN 975 MG: 325 TABLET, FILM COATED ORAL at 11:38

## 2021-02-06 RX ADMIN — ROSUVASTATIN CALCIUM 10 MG: 10 TABLET, FILM COATED ORAL at 08:59

## 2021-02-06 RX ADMIN — DOCUSATE SODIUM 100 MG: 100 CAPSULE, LIQUID FILLED ORAL at 08:59

## 2021-02-06 RX ADMIN — OXYCODONE HYDROCHLORIDE 5 MG: 5 TABLET ORAL at 09:02

## 2021-02-06 RX ADMIN — ACETAMINOPHEN 975 MG: 325 TABLET, FILM COATED ORAL at 03:47

## 2021-02-06 RX ADMIN — OXYCODONE HYDROCHLORIDE 5 MG: 5 TABLET ORAL at 11:38

## 2021-02-06 RX ADMIN — DOCUSATE SODIUM 50 MG AND SENNOSIDES 8.6 MG 1 TABLET: 8.6; 5 TABLET, FILM COATED ORAL at 08:59

## 2021-02-06 RX ADMIN — CEFAZOLIN SODIUM 2 G: 2 INJECTION, SOLUTION INTRAVENOUS at 00:12

## 2021-02-06 RX ADMIN — ASPIRIN 325 MG: 325 TABLET, COATED ORAL at 08:59

## 2021-02-06 NOTE — PLAN OF CARE
Behavior/Aggression tool color: Green  POD#: 1  Mental Status: AxOx4  Activity/dangle: ax1  Diet: Regular  Pain: PRN pain medication  [place]/Voiding: Urinal  Tele/Restraints/Iso: N/A  02/LDA: JAYNE  D/C Date:possible today

## 2021-02-06 NOTE — PROGRESS NOTES
02/06/21 1000   Quick Adds   Type of Visit Initial Occupational Therapy Evaluation   Living Environment   People in home spouse   Current Living Arrangements condominium   Home Accessibility stairs to enter home   Number of Stairs, Main Entrance 4   Transportation Anticipated car, drives self;family or friend will provide   Living Environment Comments Pt independent with ADL's at baseline. Works from home currently.   Self-Care   Usual Activity Tolerance good   Current Activity Tolerance moderate   Activity/Exercise/Self-Care Comment shoe horn, walk in shower   Disability/Function   Hearing Difficulty or Deaf no   Wear Glasses or Blind no   Concentrating, Remembering or Making Decisions Difficulty no   Difficulty Communicating no   Difficulty Eating/Swallowing no   Walking or Climbing Stairs Difficulty no   Dressing/Bathing Difficulty yes   Dressing/Bathing bathing difficulty, requires equipment;bathing difficulty, assistance 1 person   Dressing/Bathing Management spouse assist and shoe horn   Toileting issues no   Doing Errands Independently Difficulty (such as shopping) no   Fall history within last six months no   Change in Functional Status Since Onset of Current Illness/Injury yes   General Information   Onset of Illness/Injury or Date of Surgery 02/05/21   Referring Physician Desi Cali MD   Patient/Family Therapy Goal Statement (OT) Home with spouse assist   Additional Occupational Profile Info/Pertinent History of Current Problem Pt vane 70 y.o. male admitted for L FRANSISCA.    Performance Patterns (Routines, Roles, Habits) working from home, ice fishing   Existing Precautions/Restrictions no active hip ABD   Left Upper Extremity (Weight-bearing Status) full weight-bearing (FWB)   Right Upper Extremity (Weight-bearing Status) full weight-bearing (FWB)   Left Lower Extremity (Weight-bearing Status) weight-bearing as tolerated (WBAT)   Right Lower Extremity (Weight-bearing Status) full weight-bearing  (FWB)   Cognitive Status Examination   Orientation Status orientation to person, place and time   Affect/Mental Status (Cognitive) WNL   Visual Perception   Visual Impairment/Limitations WNL   Sensory   Sensory Quick Adds Pain   Range of Motion Comprehensive   General Range of Motion no range of motion deficits identified   Strength Comprehensive (MMT)   General Manual Muscle Testing (MMT) Assessment no strength deficits identified   Coordination   Upper Extremity Coordination No deficits were identified   Transfers   Transfers sit-stand transfer;bed-chair transfer;toilet transfer   Transfer Skill: Bed to Chair/Chair to Bed   Bed-Chair [place] (Transfers) supervision   Assistive Device (Bed-Chair Transfers) rolling walker   Sit-Stand Transfer   Sit-Stand [place] (Transfers) supervision   Assistive Device (Sit-Stand Transfers) walker, front-wheeled   Toilet Transfer   Type (Toilet Transfer) sit-stand;stand-sit   [place] Level (Toilet Transfer) supervision   Assistive Device (Toilet Transfer) walker, front-wheeled   Instrumental Activities of Daily Living (IADL)   Previous Responsibilities housekeeping;meal prep;medication management;finances;driving;work   Clinical Impression   Criteria for Skilled Therapeutic Interventions Met (OT) yes   OT Diagnosis impaired independence with I/ADL'   OT Problem List-Impairments impacting ADL problems related to;pain;post-surgical precautions;activity tolerance impaired;strength   ADL comments/analysis bathing, LB dressing, toileting   Assessment of Occupational Performance 3-5 Performance Deficits   Identified Performance Deficits pain, decreased ROM, impaired functional mobiltiy   Planned Therapy Interventions (OT) ADL retraining   Clinical Decision Making Complexity (OT) low complexity   Therapy Frequency (OT) 1x eval and treat   Predicted Duration of Therapy 1   Anticipated Equipment Needs Upon Discharge (OT) raised toilet seat   Risk & Benefits of therapy have  been explained evaluation/treatment results reviewed;care plan/treatment goals reviewed;risks/benefits reviewed;current/potential barriers reviewed;participants voiced agreement with care plan;participants included;patient   OT Discharge Planning    OT Discharge Recommendation (DC Rec) Home with assist   OT Rationale for DC Rec Pt SBA for functional mobility, CGA with toilet transfer, CGA/min A for LB dressing   Total Evaluation Time (Minutes)   Total Evaluation Time (Minutes) 10

## 2021-02-06 NOTE — PLAN OF CARE
A/Ox4. VSS on RA. Pain control with oxy and scheduled tylenol. CMS intact.Dressing C/D/I. Tolerating regular diet. IVF. Up Ax1 GB/W. Discharge pending.

## 2021-02-06 NOTE — PLAN OF CARE
Occupational Therapy Discharge Summary    Reason for therapy discharge:    Discharged to home.    Progress towards therapy goal(s). See goals on Care Plan in [facility] electronic health record for goal details.  Goals met    Therapy recommendation(s):    No further therapy is recommended.

## 2021-02-06 NOTE — PROGRESS NOTES
"Juan Morrissey  2021  POD # 1 sp julian    Admit Date:  2021      Doing well.  Normal healing wound.  No immediate surgical complications identified.  No excessive bleeding  Pain well-controlled.  Tolerating physical therapy and rehabilitation well.  Objective:  Blood pressure 108/66, pulse 98, temperature 98.7  F (37.1  C), temperature source Oral, resp. rate 18, height 1.778 m (5' 10\"), weight 91.2 kg (201 lb), SpO2 93 %.    Temperatures:  Current - Temp: 98.7  F (37.1  C); Max - Temp  Av.9  F (36.6  C)  Min: 97.3  F (36.3  C)  Max: 98.7  F (37.1  C)  Pulse range: Pulse  Av  Min: 53  Max: 111  Blood pressure range: Systolic (24hrs), Av , Min:100 , Max:142   ; Diastolic (24hrs), Av, Min:60, Max:91    Exam:  CMS: intact  alert, stable, wound ok  Calf nontender b le.      Labs:  No results for input(s): POTASSIUM in the last 34146 hours.  Recent Labs   Lab Test 21  0558   HGB 15.7     No results for input(s): INR in the last 02654 hours.  No results for input(s): PLT in the last 67264 hours.    PLAN: Weight bearing as tolerated  Pain control measures  Discharge to home today.       "

## 2021-02-06 NOTE — PLAN OF CARE
Physical Therapy Discharge Summary    Reason for therapy discharge:    Discharged to home.    Progress towards therapy goal(s). See goals on Care Plan in [facility] electronic health record for goal details.  Goals met    Therapy recommendation(s):    No further therapy is recommended.

## 2021-02-06 NOTE — PROGRESS NOTES
02/06/21 1000   Signing Clinician's Name / Credentials   Signing clinician's name / credentials Jaquelin Whalen OTR/L   Quick Adds   Rehab Discipline OT   ADL Training   Minutes of Treatment 24   Symptoms Noted During/After Treatment fatigue;increased pain   Treatment Adaptive equipment training;ADL training within precautions;Tasks graded to facilitate independence   Treatment Detail Pt rec'd supine in bed, agreeable to therapy. Pt sup>sit mod I with bed rails, sit>stand SBA with FWW, pt stand>sit SBA cues provided for feeling both legs at back of surface to sit on and reach back for surface to reduce falls. Pt educated on safety with reaching to marion/low and in cupboars, while supporting self against wall or surface, pt demo''d understanding with reaching into closet for gathering clothing. Pt educated on use of AE (reacher and sock aid) for LB dressing, pt demo'd understanding and donned/doff socks mod I with AE, but reports he will just have his spouse assist. Pt educated on ETS for increased ease and safety with toilet transfers. Pt SBA with use of DME. PT plans to purchase ETS with handles for home use.    Patient Response Increased independence;Able to follow techniques as instructed   OT Discharge Planning    OT Discharge Recommendation (DC Rec) Home with assist   OT Rationale for DC Rec Pt SBA for functional mobility, CGA with toilet transfer, CGA/min A for LB dressing   PT Discharge Planning    PT Discharge Recommendation (DC Rec) home with assist;home   PT Rationale for DC Rec pt would benefit from assist for donning socks, and supervision with shower transfers.    PT Brief overview of current status  SBA for ADL's, and functional mobility   Additional Documentation   OT Plan Pt d/c'ing home.   Total Session Time   Total Session Time (minutes) 34 minutes  (10 mins eval)

## 2021-02-06 NOTE — PROGRESS NOTES
Patient vital signs are at baseline: Yes  Patient able to ambulate as they were prior to admission or with assist devices provided by therapies during their stay:  Yes  Patient MUST void prior to discharge:  Yes  Patient able to tolerate oral intake:  Yes  Pain has adequate pain control using Oral analgesics:  Yes   Oriented x 4. Pain managed with oxycodone/tylenol. Up with SBA with GB/Walker. Discharging to home via pvt ride. Discharge instructions reviewed and all questions answered appropriately.

## 2021-02-25 ENCOUNTER — DOCUMENTATION ONLY (OUTPATIENT)
Dept: OTHER | Facility: CLINIC | Age: 71
End: 2021-02-25

## 2021-05-01 ENCOUNTER — HEALTH MAINTENANCE LETTER (OUTPATIENT)
Age: 71
End: 2021-05-01

## 2021-08-24 ENCOUNTER — HOSPITAL ENCOUNTER (OUTPATIENT)
Facility: CLINIC | Age: 71
Setting detail: OBSERVATION
Discharge: HOME | End: 2021-08-26
Attending: EMERGENCY MEDICINE | Admitting: INTERNAL MEDICINE
Payer: COMMERCIAL

## 2021-08-24 DIAGNOSIS — R07.9 CHEST PAIN, UNSPECIFIED TYPE: ICD-10-CM

## 2021-08-24 DIAGNOSIS — K92.2 GASTROINTESTINAL HEMORRHAGE, UNSPECIFIED GASTROINTESTINAL HEMORRHAGE TYPE: ICD-10-CM

## 2021-08-24 LAB
ALBUMIN SERPL-MCNC: 3.8 G/DL (ref 3.4–5)
ALP SERPL-CCNC: 70 U/L (ref 40–150)
ALT SERPL W P-5'-P-CCNC: 22 U/L (ref 0–70)
ANION GAP SERPL CALCULATED.3IONS-SCNC: 6 MMOL/L (ref 3–14)
AST SERPL W P-5'-P-CCNC: 16 U/L (ref 0–45)
BASOPHILS # BLD AUTO: 0 10E3/UL (ref 0–0.2)
BASOPHILS NFR BLD AUTO: 0 %
BILIRUB SERPL-MCNC: 0.8 MG/DL (ref 0.2–1.3)
BUN SERPL-MCNC: 11 MG/DL (ref 7–30)
CALCIUM SERPL-MCNC: 9.9 MG/DL (ref 8.5–10.1)
CHLORIDE BLD-SCNC: 102 MMOL/L (ref 94–109)
CO2 SERPL-SCNC: 29 MMOL/L (ref 20–32)
CREAT SERPL-MCNC: 0.97 MG/DL (ref 0.66–1.25)
D DIMER PPP FEU-MCNC: 1.2 UG/ML FEU (ref 0–0.5)
EOSINOPHIL # BLD AUTO: 0 10E3/UL (ref 0–0.7)
EOSINOPHIL NFR BLD AUTO: 0 %
ERYTHROCYTE [DISTWIDTH] IN BLOOD BY AUTOMATED COUNT: 15.1 % (ref 10–15)
GFR SERPL CREATININE-BSD FRML MDRD: 79 ML/MIN/1.73M2
GLUCOSE BLD-MCNC: 131 MG/DL (ref 70–99)
HCT VFR BLD AUTO: 39.9 % (ref 40–53)
HGB BLD-MCNC: 12.7 G/DL (ref 13.3–17.7)
IMM GRANULOCYTES # BLD: 0 10E3/UL
IMM GRANULOCYTES NFR BLD: 0 %
LIPASE SERPL-CCNC: 91 U/L (ref 73–393)
LYMPHOCYTES # BLD AUTO: 1.9 10E3/UL (ref 0.8–5.3)
LYMPHOCYTES NFR BLD AUTO: 18 %
MCH RBC QN AUTO: 30.4 PG (ref 26.5–33)
MCHC RBC AUTO-ENTMCNC: 31.8 G/DL (ref 31.5–36.5)
MCV RBC AUTO: 96 FL (ref 78–100)
MONOCYTES # BLD AUTO: 0.5 10E3/UL (ref 0–1.3)
MONOCYTES NFR BLD AUTO: 5 %
NEUTROPHILS # BLD AUTO: 8.4 10E3/UL (ref 1.6–8.3)
NEUTROPHILS NFR BLD AUTO: 77 %
NRBC # BLD AUTO: 0 10E3/UL
NRBC BLD AUTO-RTO: 0 /100
PLATELET # BLD AUTO: 369 10E3/UL (ref 150–450)
POTASSIUM BLD-SCNC: 3.8 MMOL/L (ref 3.4–5.3)
PROT SERPL-MCNC: 7.6 G/DL (ref 6.8–8.8)
RBC # BLD AUTO: 4.18 10E6/UL (ref 4.4–5.9)
SODIUM SERPL-SCNC: 137 MMOL/L (ref 133–144)
TROPONIN I SERPL-MCNC: <0.015 UG/L (ref 0–0.04)
WBC # BLD AUTO: 10.9 10E3/UL (ref 4–11)

## 2021-08-24 PROCEDURE — 36415 COLL VENOUS BLD VENIPUNCTURE: CPT | Performed by: EMERGENCY MEDICINE

## 2021-08-24 PROCEDURE — 83690 ASSAY OF LIPASE: CPT | Performed by: EMERGENCY MEDICINE

## 2021-08-24 PROCEDURE — 85025 COMPLETE CBC W/AUTO DIFF WBC: CPT | Performed by: EMERGENCY MEDICINE

## 2021-08-24 PROCEDURE — 80053 COMPREHEN METABOLIC PANEL: CPT | Performed by: EMERGENCY MEDICINE

## 2021-08-24 PROCEDURE — 85379 FIBRIN DEGRADATION QUANT: CPT | Performed by: EMERGENCY MEDICINE

## 2021-08-24 PROCEDURE — 93005 ELECTROCARDIOGRAM TRACING: CPT

## 2021-08-24 PROCEDURE — 99285 EMERGENCY DEPT VISIT HI MDM: CPT | Mod: 25

## 2021-08-24 PROCEDURE — 85610 PROTHROMBIN TIME: CPT | Performed by: INTERNAL MEDICINE

## 2021-08-24 PROCEDURE — 84484 ASSAY OF TROPONIN QUANT: CPT | Performed by: EMERGENCY MEDICINE

## 2021-08-25 ENCOUNTER — ANESTHESIA EVENT (OUTPATIENT)
Dept: GASTROENTEROLOGY | Facility: CLINIC | Age: 71
End: 2021-08-25
Payer: COMMERCIAL

## 2021-08-25 ENCOUNTER — APPOINTMENT (OUTPATIENT)
Dept: CT IMAGING | Facility: CLINIC | Age: 71
End: 2021-08-25
Attending: EMERGENCY MEDICINE
Payer: COMMERCIAL

## 2021-08-25 PROBLEM — K92.2 GASTROINTESTINAL HEMORRHAGE, UNSPECIFIED GASTROINTESTINAL HEMORRHAGE TYPE: Status: ACTIVE | Noted: 2021-08-25

## 2021-08-25 PROBLEM — R07.9 CHEST PAIN, UNSPECIFIED TYPE: Status: ACTIVE | Noted: 2021-08-25

## 2021-08-25 LAB
ABO/RH(D): NORMAL
ANTIBODY SCREEN: NEGATIVE
ATRIAL RATE - MUSE: 106 BPM
ATRIAL RATE - MUSE: 117 BPM
DIASTOLIC BLOOD PRESSURE - MUSE: NORMAL MMHG
DIASTOLIC BLOOD PRESSURE - MUSE: NORMAL MMHG
ERYTHROCYTE [DISTWIDTH] IN BLOOD BY AUTOMATED COUNT: 15.2 % (ref 10–15)
ERYTHROCYTE [DISTWIDTH] IN BLOOD BY AUTOMATED COUNT: 15.5 % (ref 10–15)
HCT VFR BLD AUTO: 34.3 % (ref 40–53)
HCT VFR BLD AUTO: 34.6 % (ref 40–53)
HGB BLD-MCNC: 10.7 G/DL (ref 13.3–17.7)
HGB BLD-MCNC: 10.8 G/DL (ref 13.3–17.7)
HGB BLD-MCNC: 12 G/DL (ref 13.3–17.7)
HOLD SPECIMEN: NORMAL
INR PPP: 1.07 (ref 0.85–1.15)
INTERPRETATION ECG - MUSE: NORMAL
INTERPRETATION ECG - MUSE: NORMAL
LACTATE SERPL-SCNC: 1.1 MMOL/L (ref 0.7–2)
MCH RBC QN AUTO: 30.2 PG (ref 26.5–33)
MCH RBC QN AUTO: 30.3 PG (ref 26.5–33)
MCHC RBC AUTO-ENTMCNC: 31.2 G/DL (ref 31.5–36.5)
MCHC RBC AUTO-ENTMCNC: 31.2 G/DL (ref 31.5–36.5)
MCV RBC AUTO: 97 FL (ref 78–100)
MCV RBC AUTO: 97 FL (ref 78–100)
P AXIS - MUSE: 49 DEGREES
P AXIS - MUSE: 56 DEGREES
PLATELET # BLD AUTO: 273 10E3/UL (ref 150–450)
PLATELET # BLD AUTO: 286 10E3/UL (ref 150–450)
PR INTERVAL - MUSE: 158 MS
PR INTERVAL - MUSE: 164 MS
QRS DURATION - MUSE: 76 MS
QRS DURATION - MUSE: 78 MS
QT - MUSE: 326 MS
QT - MUSE: 334 MS
QTC - MUSE: 443 MS
QTC - MUSE: 454 MS
R AXIS - MUSE: 69 DEGREES
R AXIS - MUSE: 77 DEGREES
RBC # BLD AUTO: 3.54 10E6/UL (ref 4.4–5.9)
RBC # BLD AUTO: 3.57 10E6/UL (ref 4.4–5.9)
SARS-COV-2 RNA RESP QL NAA+PROBE: NEGATIVE
SPECIMEN EXPIRATION DATE: NORMAL
SYSTOLIC BLOOD PRESSURE - MUSE: NORMAL MMHG
SYSTOLIC BLOOD PRESSURE - MUSE: NORMAL MMHG
T AXIS - MUSE: 1 DEGREES
T AXIS - MUSE: 15 DEGREES
TROPONIN I SERPL-MCNC: <0.015 UG/L (ref 0–0.04)
TROPONIN I SERPL-MCNC: <0.015 UG/L (ref 0–0.04)
VENTRICULAR RATE- MUSE: 106 BPM
VENTRICULAR RATE- MUSE: 117 BPM
WBC # BLD AUTO: 9.6 10E3/UL (ref 4–11)
WBC # BLD AUTO: 9.8 10E3/UL (ref 4–11)

## 2021-08-25 PROCEDURE — G0378 HOSPITAL OBSERVATION PER HR: HCPCS

## 2021-08-25 PROCEDURE — 96365 THER/PROPH/DIAG IV INF INIT: CPT | Mod: 59

## 2021-08-25 PROCEDURE — 36415 COLL VENOUS BLD VENIPUNCTURE: CPT | Performed by: INTERNAL MEDICINE

## 2021-08-25 PROCEDURE — C9113 INJ PANTOPRAZOLE SODIUM, VIA: HCPCS | Performed by: INTERNAL MEDICINE

## 2021-08-25 PROCEDURE — 93005 ELECTROCARDIOGRAM TRACING: CPT | Mod: 76

## 2021-08-25 PROCEDURE — 250N000013 HC RX MED GY IP 250 OP 250 PS 637: Performed by: EMERGENCY MEDICINE

## 2021-08-25 PROCEDURE — 96376 TX/PRO/DX INJ SAME DRUG ADON: CPT

## 2021-08-25 PROCEDURE — C9803 HOPD COVID-19 SPEC COLLECT: HCPCS

## 2021-08-25 PROCEDURE — 71275 CT ANGIOGRAPHY CHEST: CPT

## 2021-08-25 PROCEDURE — 84484 ASSAY OF TROPONIN QUANT: CPT | Mod: 91 | Performed by: INTERNAL MEDICINE

## 2021-08-25 PROCEDURE — 250N000011 HC RX IP 250 OP 636: Performed by: INTERNAL MEDICINE

## 2021-08-25 PROCEDURE — 85027 COMPLETE CBC AUTOMATED: CPT | Performed by: INTERNAL MEDICINE

## 2021-08-25 PROCEDURE — 36415 COLL VENOUS BLD VENIPUNCTURE: CPT | Performed by: EMERGENCY MEDICINE

## 2021-08-25 PROCEDURE — 250N000011 HC RX IP 250 OP 636: Performed by: EMERGENCY MEDICINE

## 2021-08-25 PROCEDURE — 83605 ASSAY OF LACTIC ACID: CPT | Performed by: EMERGENCY MEDICINE

## 2021-08-25 PROCEDURE — 96366 THER/PROPH/DIAG IV INF ADDON: CPT

## 2021-08-25 PROCEDURE — 258N000003 HC RX IP 258 OP 636: Performed by: INTERNAL MEDICINE

## 2021-08-25 PROCEDURE — 84484 ASSAY OF TROPONIN QUANT: CPT | Performed by: EMERGENCY MEDICINE

## 2021-08-25 PROCEDURE — 210N000002 HC R&B HEART CARE

## 2021-08-25 PROCEDURE — 250N000009 HC RX 250: Performed by: EMERGENCY MEDICINE

## 2021-08-25 PROCEDURE — 250N000009 HC RX 250

## 2021-08-25 PROCEDURE — 86900 BLOOD TYPING SEROLOGIC ABO: CPT | Performed by: INTERNAL MEDICINE

## 2021-08-25 PROCEDURE — 99223 1ST HOSP IP/OBS HIGH 75: CPT | Mod: AI | Performed by: INTERNAL MEDICINE

## 2021-08-25 PROCEDURE — 96361 HYDRATE IV INFUSION ADD-ON: CPT

## 2021-08-25 PROCEDURE — 87635 SARS-COV-2 COVID-19 AMP PRB: CPT | Performed by: EMERGENCY MEDICINE

## 2021-08-25 RX ORDER — ACETAMINOPHEN 650 MG/1
650 SUPPOSITORY RECTAL EVERY 6 HOURS PRN
Status: DISCONTINUED | OUTPATIENT
Start: 2021-08-25 | End: 2021-08-26 | Stop reason: HOSPADM

## 2021-08-25 RX ORDER — PROCHLORPERAZINE 25 MG
12.5 SUPPOSITORY, RECTAL RECTAL EVERY 12 HOURS PRN
Status: DISCONTINUED | OUTPATIENT
Start: 2021-08-25 | End: 2021-08-26 | Stop reason: HOSPADM

## 2021-08-25 RX ORDER — NALOXONE HYDROCHLORIDE 0.4 MG/ML
0.2 INJECTION, SOLUTION INTRAMUSCULAR; INTRAVENOUS; SUBCUTANEOUS
Status: DISCONTINUED | OUTPATIENT
Start: 2021-08-25 | End: 2021-08-26 | Stop reason: HOSPADM

## 2021-08-25 RX ORDER — LIDOCAINE HYDROCHLORIDE 20 MG/ML
SOLUTION OROPHARYNGEAL
Status: COMPLETED
Start: 2021-08-25 | End: 2021-08-25

## 2021-08-25 RX ORDER — SODIUM CHLORIDE, SODIUM LACTATE, POTASSIUM CHLORIDE, CALCIUM CHLORIDE 600; 310; 30; 20 MG/100ML; MG/100ML; MG/100ML; MG/100ML
INJECTION, SOLUTION INTRAVENOUS CONTINUOUS
Status: DISCONTINUED | OUTPATIENT
Start: 2021-08-25 | End: 2021-08-26 | Stop reason: HOSPADM

## 2021-08-25 RX ORDER — ASPIRIN 325 MG
325 TABLET ORAL ONCE
Status: COMPLETED | OUTPATIENT
Start: 2021-08-25 | End: 2021-08-25

## 2021-08-25 RX ORDER — ONDANSETRON 2 MG/ML
4 INJECTION INTRAMUSCULAR; INTRAVENOUS EVERY 6 HOURS PRN
Status: DISCONTINUED | OUTPATIENT
Start: 2021-08-25 | End: 2021-08-26 | Stop reason: HOSPADM

## 2021-08-25 RX ORDER — PROCHLORPERAZINE MALEATE 5 MG
5 TABLET ORAL EVERY 6 HOURS PRN
Status: DISCONTINUED | OUTPATIENT
Start: 2021-08-25 | End: 2021-08-26 | Stop reason: HOSPADM

## 2021-08-25 RX ORDER — ASCORBIC ACID 500 MG
500 TABLET ORAL EVERY OTHER DAY
COMMUNITY

## 2021-08-25 RX ORDER — FERROUS SULFATE 325(65) MG
325 TABLET ORAL
COMMUNITY

## 2021-08-25 RX ORDER — NALOXONE HYDROCHLORIDE 0.4 MG/ML
0.4 INJECTION, SOLUTION INTRAMUSCULAR; INTRAVENOUS; SUBCUTANEOUS
Status: DISCONTINUED | OUTPATIENT
Start: 2021-08-25 | End: 2021-08-26 | Stop reason: HOSPADM

## 2021-08-25 RX ORDER — IOPAMIDOL 755 MG/ML
72 INJECTION, SOLUTION INTRAVASCULAR ONCE
Status: COMPLETED | OUTPATIENT
Start: 2021-08-25 | End: 2021-08-25

## 2021-08-25 RX ORDER — NITROGLYCERIN 0.4 MG/1
0.4 TABLET SUBLINGUAL EVERY 5 MIN PRN
Status: DISCONTINUED | OUTPATIENT
Start: 2021-08-25 | End: 2021-08-25

## 2021-08-25 RX ORDER — OXYCODONE HYDROCHLORIDE 5 MG/1
5 TABLET ORAL EVERY 4 HOURS PRN
Status: DISCONTINUED | OUTPATIENT
Start: 2021-08-25 | End: 2021-08-26 | Stop reason: HOSPADM

## 2021-08-25 RX ORDER — ONDANSETRON 4 MG/1
4 TABLET, ORALLY DISINTEGRATING ORAL EVERY 6 HOURS PRN
Status: DISCONTINUED | OUTPATIENT
Start: 2021-08-25 | End: 2021-08-26 | Stop reason: HOSPADM

## 2021-08-25 RX ORDER — LIDOCAINE 40 MG/G
CREAM TOPICAL
Status: DISCONTINUED | OUTPATIENT
Start: 2021-08-25 | End: 2021-08-26 | Stop reason: HOSPADM

## 2021-08-25 RX ORDER — LIDOCAINE HYDROCHLORIDE 20 MG/ML
JELLY TOPICAL
Status: DISCONTINUED
Start: 2021-08-25 | End: 2021-08-25 | Stop reason: HOSPADM

## 2021-08-25 RX ORDER — ACETAMINOPHEN 325 MG/1
650 TABLET ORAL EVERY 6 HOURS PRN
Status: DISCONTINUED | OUTPATIENT
Start: 2021-08-25 | End: 2021-08-26 | Stop reason: HOSPADM

## 2021-08-25 RX ADMIN — IOPAMIDOL 72 ML: 755 INJECTION, SOLUTION INTRAVENOUS at 00:19

## 2021-08-25 RX ADMIN — LIDOCAINE HYDROCHLORIDE: 20 SOLUTION ORAL; TOPICAL at 05:08

## 2021-08-25 RX ADMIN — SODIUM CHLORIDE 8 MG/HR: 9 INJECTION, SOLUTION INTRAVENOUS at 06:32

## 2021-08-25 RX ADMIN — SODIUM CHLORIDE, POTASSIUM CHLORIDE, SODIUM LACTATE AND CALCIUM CHLORIDE: 600; 310; 30; 20 INJECTION, SOLUTION INTRAVENOUS at 10:25

## 2021-08-25 RX ADMIN — NITROGLYCERIN 0.4 MG: 0.4 TABLET SUBLINGUAL at 02:33

## 2021-08-25 RX ADMIN — SODIUM CHLORIDE 8 MG/HR: 9 INJECTION, SOLUTION INTRAVENOUS at 16:40

## 2021-08-25 RX ADMIN — PANTOPRAZOLE SODIUM 40 MG: 40 INJECTION, POWDER, FOR SOLUTION INTRAVENOUS at 06:19

## 2021-08-25 RX ADMIN — SODIUM CHLORIDE 96 ML: 9 INJECTION, SOLUTION INTRAVENOUS at 00:19

## 2021-08-25 RX ADMIN — SODIUM CHLORIDE, POTASSIUM CHLORIDE, SODIUM LACTATE AND CALCIUM CHLORIDE: 600; 310; 30; 20 INJECTION, SOLUTION INTRAVENOUS at 20:00

## 2021-08-25 RX ADMIN — ASPIRIN 325 MG ORAL TABLET 325 MG: 325 PILL ORAL at 02:32

## 2021-08-25 RX ADMIN — LIDOCAINE HYDROCHLORIDE 30 ML: 20 SOLUTION ORAL; TOPICAL at 02:59

## 2021-08-25 ASSESSMENT — MIFFLIN-ST. JEOR: SCORE: 1663.93

## 2021-08-25 ASSESSMENT — ACTIVITIES OF DAILY LIVING (ADL)
ADLS_ACUITY_SCORE: 19

## 2021-08-25 NOTE — ED TRIAGE NOTES
"Pt was hospitalized 1 month ago for GI bleeding. States he continues with abdominal pain, bloted and \"heartburn\" with N/V. Denies any melena.  "

## 2021-08-25 NOTE — PHARMACY-ADMISSION MEDICATION HISTORY
Pharmacy Medication History  Admission medication history interview status for the 8/24/2021  admission is complete. See EPIC admission navigator for prior to admission medications     Location of Interview: Patient room  Medication history sources: Patient    Significant changes made to the medication list:  Removed Tylenol, Aspirin, Oxycodone, Senokot-S.  Added Ferrous sulfate and Vitamin C.     In the past week, patient estimated taking medication this percent of the time: greater than 90%    Medication reconciliation completed by provider prior to medication history? No    Time spent in this activity: 20 minutes    Prior to Admission medications    Medication Sig Last Dose Taking? Auth Provider   B Complex Vitamins (B COMPLEX PO) Take 1 tablet by mouth daily  8/23/2021 Yes Reported, Patient   ferrous sulfate (FEROSUL) 325 (65 Fe) MG tablet Take 325 mg by mouth 3 times daily (with meals) 8/24/2021 at AM Yes Unknown, Entered By History   finasteride (PROPECIA) 1 MG tablet Take 1 mg by mouth daily 8/24/2021 Yes Reported, Patient   hydrOXYzine (ATARAX) 10 MG tablet Take 1 tablet (10 mg) by mouth every 6 hours as needed for itching or anxiety (with pain, moderate pain) More than a month Yes Thomas Barrera PA-C   Omega-3 Fatty Acids (OMEGA 3 PO) Take 1 Dose by mouth daily Past Week at Unknown time Yes Reported, Patient   rosuvastatin (CRESTOR) 10 MG tablet Take 10 mg by mouth daily 8/24/2021 Yes Reported, Patient   sildenafil (REVATIO) 20 MG tablet Take 20 mg by mouth daily as needed Past Week at Unknown time Yes Reported, Patient   vitamin C (ASCORBIC ACID) 500 MG tablet Take 500 mg by mouth every other day Takes with iron. 8/24/2021 at AM Yes Unknown, Entered By History   VITAMIN D PO Take 1 Dose by mouth daily 8/23/2021 Yes Reported, Patient   VITAMIN E PO Take 1 tablet by mouth daily 8/22/2021 Yes Reported, Patient       The information provided in this note is only as accurate as the sources available at  the time of update(s)

## 2021-08-25 NOTE — CONSULTS
[facility] Consultation     Juan Morrissey  [address] [address] [address]  71 year old male     Admission Date/Time: 8/24/2021  Primary Care Provider: Teodoro Reyna  Referring / Attending Physician:  Carrie     We were asked to see the patient in consultation by Dr. Butler for evaluation of UGIB.    ASSESSMENT:    Recurrent overt, obscure GI bleed  Established esophagitis on endoscopy in [place] in July    RECOMMENDATIONS:  Agree with PPI  Clear liquid diet, NPO at MN    Discussed with patient the risks and benefits of urgent EGD today versus EGD/enteroscopy tomorrow.  He is in favor of the latter., as we discussed that urgent endoscopy in the setting of recent bleeding oftentimes necessitates repeat endoscopy due to poor visualization/retained clot throughout the stomach.  Though I agree the differential diagnosis includes Dieulafoy lesion, I would still be in favor of repeating endoscopy with enteroscopy tomorrow (which the endoscopy schedule cannot accommodate today).    Thank you for involving us in this patient's care.  Please call me with any questions.    Dr. Butler paged to review above recommendations.    Kg Liriano DO   [facility]  Cell [phone]    ________________________________________________________________________        CC: Nausea and abdominal distention     HPI:  Juan Morrissey is a 71 year old male who I am asked to see for upper GI bleeding.  The patient has previously been evaluated with endoscopy at the end of July in [place] after he developed 4 days of melena.  He went on to have an endoscopy that demonstrated esophagitis without any other diagnostic findings.  There was discussion of further endoscopic evaluation including colonoscopy versus capsule endoscopy however the patient went on to do well with iron therapy.    The patient awoke in the early morning of the 24th with worsening abdominal discomfort which she describes as bloating and  "distention.  This seemed to worsen and he presented to the emergency department.  He had not passed any melena and was having only green, soft to liquid stools.  This was a bit of a change as he had been having dark-colored stool while on iron therapy started after the above-mentioned endoscopy in July.  CT scan of the chest was performed to rule out pulmonary embolism demonstrating distended stomach and nonspecific fluid around the liver.  NG tube was placed and \"approximately 600 cc of dark red blood returned.\"  Protonix was started.    Currently, the patient feels well.  He is not nauseated or having any melena or abdominal pain.  I had a long discussion with him regarding our options for further evaluation as above.     ROS: A comprehensive ten point review of systems was negative aside from those in mentioned in the HPI.      PAST MEDICAL HISTORY:  Patient Active Problem List    Diagnosis Date Noted     Chest pain, unspecified type 08/25/2021     Priority: Medium     Gastrointestinal hemorrhage, unspecified gastrointestinal hemorrhage type 08/25/2021     Priority: Medium     History of total hip replacement 02/05/2021     Priority: Medium     SOCIAL HISTORY:  Social History     Tobacco Use     Smoking status: Former Smoker     Smokeless tobacco: Never Used   Substance Use Topics     Alcohol use: Yes     Comment: 2WINE,4 SHOTS OF LIQUOR/ WEEK     Drug use: Not on file     FAMILY HISTORY:  No family history on file.  ALLERGIES: No Known Allergies  MEDICATIONS:   Current Facility-Administered Medications   Medication     acetaminophen (TYLENOL) tablet 650 mg    Or     acetaminophen (TYLENOL) Suppository 650 mg     lactated ringers infusion     lidocaine (LMX4) cream     lidocaine 1 % 0.1-1 mL     melatonin tablet 3 mg     naloxone (NARCAN) injection 0.2 mg    Or     naloxone (NARCAN) injection 0.4 mg    Or     naloxone (NARCAN) injection 0.2 mg    Or     naloxone (NARCAN) injection 0.4 mg     ondansetron " "(ZOFRAN-ODT) ODT tab 4 mg    Or     ondansetron (ZOFRAN) injection 4 mg     oxyCODONE (ROXICODONE) tablet 5 mg     pantoprazole (PROTONIX) 80 mg in sodium chloride 0.9 % 100 mL infusion     prochlorperazine (COMPAZINE) injection 5 mg    Or     prochlorperazine (COMPAZINE) tablet 5 mg    Or     prochlorperazine (COMPAZINE) suppository 12.5 mg     sodium chloride (PF) 0.9% PF flush 3 mL     sodium chloride (PF) 0.9% PF flush 3 mL     PHYSICAL EXAM:   /73 (BP Location: Left arm)   Pulse 76   Temp 98.5  F (36.9  C) (Oral)   Resp 16   Ht 1.778 m (5' 10\")   Wt 89.3 kg (196 lb 12.8 oz)   SpO2 99%   BMI 28.24 kg/m     GEN: Alert, oriented x3, communicative and in NAD.  BECKI: AT, anicteric, OP without erythema, exudate, or ulcers.    NECK: Supple.    LYMPH: No LAD noted.  HRT: RRR  LUNGS: CTA  ABD: ND, +BS, no guarding or pain to palpation, no rebound, no HSM.  SKIN: No rash, jaundice or spider angiomata  MSKL: LE free of edema, strength 5/5 all 4 extrems  NEURO: CN grossly intact, sensation intact to light touch, toes downgoing.     ADDITIONAL DATA:   I reviewed the patient's new clinical lab test results.   Recent Labs   Lab Test 08/25/21  0529 08/24/21  2307 02/06/21  0747   WBC  --  10.9  --    HGB 12.0* 12.7* 11.8*   MCV  --  96  --    PLT  --  369  --    INR  --  1.07  --      Recent Labs   Lab Test 08/24/21  2307   POTASSIUM 3.8   CHLORIDE 102   CO2 29   BUN 11   ANIONGAP 6     Recent Labs   Lab Test 08/24/21 2307   ALBUMIN 3.8   BILITOTAL 0.8   ALT 22   AST 16   LIPASE 91        I reviewed the patient's new imaging results.               "

## 2021-08-25 NOTE — PLAN OF CARE
VSS. Tele: SR. Denies pain but c/o abdominal fullness as the day went on. Clear liquid diet today, NPO at midnight. EGD and enteroscopy tomorrow. Hgb slowing dropping. Protonix drip and IVMF continue. Will continue to monitor.

## 2021-08-25 NOTE — H&P
[facility]    History and Physical - Hospitalist Service       Date of Admission:  8/24/2021    Assessment & Plan      Juan Morrissey is a 71 year old male admitted on 8/24/2021. He presents to the emergency department for evaluation of essentially 24 hours of abdominal bloating and discomfort, acid reflux-like central chest discomfort.  Recent history of melenic stool x4 days and endoscopy without clear upper GI source of bleed with hospitalization at [place]; did have esophagitis at that time, though not thought to be source of bleed.    Nausea, diaphoresis, hypersalivation: Anticipate patient symptoms are secondary to uncontrolled GERD with esophagitis, though symptoms are quite pronounced.  Patient describes near emesis this evening.  Interestingly, he tells me that he has a history of episodic hypersalivation which happens approximately once every other month and is not necessarily associated with nausea (hypersalivation/water brash w/ GERD?).  Patient with a distended stomach on CT despite very minimal intake over the past day (reports drinking water throughout the day, though only food intake today would have been a peach in the morning, a few bites of rice and a shrimp for dinner as he was generally feeling unwell); this seems unusual and somewhat notable.  No significant liquid intake in the past 4 hours prior to CT scan.  Could represent gastroparesis, though no clear etiology for this as no significant hyperglycemia or hypertension.  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  Consider also Dieulafoy lesion of the stomach which might result in blood in stomach and distended gastric body as well as patient's current sinus tachycardia.  Ileus or obstruction is possible, though seem less likely based on  exam and chest CT imaging.    -Discussed with patient in the emergency department placement of NG for diagnostic and therapeutic benefit.  Patient agreeable to trial of this.  Blood in stomach would result in n.p.o. status and likely endoscopy, food from earlier in day would suggest ileus or gastroparesis, saliva/gastric acid would be more suggestive of possible gastric distention from significant hypersalivation as described by patient.    -Gastroenterology consulted  Acute upper GI hemorrhage  Addendum:  NG placed with approximately 600 milliliters blood return, patient's symptoms of esophagitis resolved, abdominal discomfort improved.  Given relatively normal EGD at the end of July for melenic stool, question if this represents dieulafoy lesion with brisk intermittent bleeding.  Patient will be made n.p.o., gastroenterology consulted, starting Protonix bolus and drip, removing NG tube.    Anemia: Improving blood loss anemia from recent upper GI bleed, thought secondary to small bowel bleed.  Hospitalized at [facility] with lovely hemoglobin in the 7 range, 4 days of melenic stool.  Did not require blood transfusion during that hospitalization.  EGD with no blood in stomach, grade B esophagitis.  Thought to have small bowel blood loss by gastroenterology team based on EGD findings.  Reports eating spinach and iron tabs since discharge, hemoglobin has now improved to 12.7.   -Resume prior to admission iron supplementation at discharge  -Repeat CBC to ensure stable.  See above given concern for GI bleed causing gastric distention  -Outpatient colonoscopy, and if rebleed, small bowel study has been recommended.  -Patient consented for blood products by myself in the emergency department.    Addendum: Patient with upper GI bleed as noted with NG return.  Trend CBC every 6 hours.  Type and screen sent    EKG changes: Patient describes some central chest discomfort, though description is more consistent with  acid reflux, has had similar acid reflux in the past.  This said, serial EKGs obtained in the emergency department do have some V4 and V5 ST depressions which later resolved.  No benefit from nitroglycerin.  In 2016 had a stress echocardiogram through [facility] as part of an executive physical; he recalls some abnormality on his EKG which resolved at that time as well, though cannot clarify what this finding was.  Currently with sinus tachycardia in the 100 range, though he reports that his baseline heart rate is in the 70s.  -Complete troponin trend  -Sinus tachycardia and EKG findings likely secondary to demand with acute upper GI bleed.    Nonspecific fluid right superior liver margin: Noted on CT imaging during PE rule out.  No trauma, no pain with palpation of right upper quadrant.  ALT, AST, bilirubin, lipase within normal limits.  -GI consult as above  -Consider right upper quadrant ultrasound, though with lack of laboratory abnormalities or symptoms in this quadrant, seems less likely to provide significant utility.  Will await NG trial as this might lead to alternate diagnostic imaging.       Diet:     DVT Prophylaxis: Pneumatic compression devices  B[place] Catheter: Not present  Central Lines: None  Code Status:    Full code.  Confirmed with patient on admission    Clinically Significant Risk Factors Present on Admission              # Platelet Defect: home medication list includes an antiplatelet medication      Disposition Plan   Expected discharge: Likely 2 days recommended to prior living arrangement once GI bleed evaluation complete, hemoglobin stable, adequate pain management/ tolerating PO medications and Tolerating oral intake..     The patient's care was discussed with the Patient and Bedside nurse, Dr. Guzman in the emergency department..    Best Galarza MD  [facility]  Securely message with the Vocera Web Console (learn more here)  Text page via [employer]  Paging/Directory      ______________________________________________________________________    Chief Complaint   Nausea, abdominal discomfort    History is obtained per patient, chart review, discussion with ER provider, review of outside records including recent EGD report with small lipoma in duodenum, bradycardia esophagitis, no blood products in stomach.    History of Present Illness   Juan Morrissey is a 71 year old male who presents to the emergency department with 24 hours of acid reflux pain, abdominal bloating and generalized abdominal discomfort resulting in anorexia, nausea without emesis tonight.    This evening, patient experienced hypersalivation, nausea without emesis, and an urge to pass gas or defecate though he was unable to do so.  Discussed with patient that this is somewhat concerning as occasionally have seen this associated with brisk upper GI bleeds, and patient does have a history of GI bleed of unclear etiology recently.  Patient was hospitalized at the end of July at [facility] for 4 days of melenic stool and associated lightheadedness and weakness.  Hemoglobin at that time was in the mid 7 range, did not require transfusion.  Endoscopy, as above, with grade B esophagitis, no blood in stomach, small lipoma in duodenum.  Gastroenterology team felt that the source of his GI bleed was from small intestine, and plan was for small bowel eval if recurrence of bleeding, outpatient colonoscopy.    Patient felt well 8/23.  Woke up in the early morning hours 8/24 AM with GERD symptoms pain central chest to his throat.  He also describes feeling bloated, generalized abdominal discomfort.  This was associated with anorexia.  He tells me that he ate a peach for breakfast, was trying to drink water throughout the day to stay hydrated.  He took some Tums, this might have been slightly palliative, though no significant improvement in his symptoms.  Tonight, he had dinner with his family and  "friends, and tells me that he \"made in appearance.\"  He had hardly anything to eat.  He describes a few bites of rice and a single shrimp as he was feeling unwell.  Abdominal discomfort and acid reflux sensation persisted, so he presented to the emergency department    Patient has not noted any melenic stool over the last 24 hours, though does feel weaker than he would consider usual.  This past weekend he was able to go for 15 mile bike ride, though has felt generally fatigued over the course of 8/24.  He tells me that he has been taking his iron supplementation since his recent hospitalization and eating spinach, has not noted that this causes him GI upset, so seems unlikely that iron supplementation is the cause of his presentation.  He reports that his stools have been somewhat less regular since initiating iron therapy and eating more spinach.  Describes greenish stools, likely related to spinach, and occasionally more loose with increased fiber.  Prior to his change in diet after recent discharge, patient tells me that he would typically have a formed \"pinched loaf\" bowel movement daily after coffee.    In the emergency department, with patient's description of central chest discomfort, which seems more consistent with GI reflux per his prior descriptions and complaints, a D-dimer was obtained as well as an EKG.  EKG demonstrated sinus tachycardia 120 range, lateral ST depressions in V4, V5.  Patient received nitroglycerin with no improvement in his symptoms, repeat EKG with resolution of lateral ST depressions.  Troponin was negative.    With elevated D-dimer, a CT PE study was performed demonstrating no pulmonary embolism, though nonspecific distended gastric body and some nonspecific fluid above superior lateral aspect of liver.    Finding of distended gastric body seems somewhat atypical given patient's description of poor oral intake.  Discussed with patient potential etiologies of this including ileus, " bowel obstruction, gastroparesis.  He does describe hypersalivation associated with acute onset nausea, and it is possible he had significant enough water brash that he filled his stomach with saliva.  Discussed also possibility of DieulaFoy lesion resulting in GI bleed which might rapidly distend stomach or even intussusception as he was noted to have a lipoma in his proximal duodenum on EGD, and intermittent intussusception could result in GI blockage symptoms, as well as small bowel bleeding.      He has no history of significant hypertension or diabetes.  He has some mildly elevated blood glucose here.  No prior history of gastroparesis    In the context of this discussion, suggested diagnostic and potentially therapeutic NG tube placement.    NG tube was placed, and returned approximately 600 mL of dark red blood.  Patient symptoms resolved.  Consistent with acute upper GI hemorrhage, again, concern for Dieulafoy lesion.  Protonix initiated.  Keep n.p.o.    Review of Systems    The 10 point Review of Systems is negative other than noted in the HPI or here.  No fevers or chills  Patient with hiccups over the past 24 hours  Occasionally has a dry cough which is nonproductive, also new over the past 24 hours    Past Medical History    I have reviewed this patient's medical history and updated it with pertinent information if needed.   Past Medical History:   Diagnosis Date     Arthritis      DJD (degenerative joint disease)      Elevated HDL      GERD (gastroesophageal reflux disease) 2017     Hyperlipidemia, mixed      Melanoma (H) 2017     Psoriasis      Rosacea        Past Surgical History   I have reviewed this patient's surgical history and updated it with pertinent information if needed.  Past Surgical History:   Procedure Laterality Date     ARTHROPLASTY HIP Right 2/5/2021    Procedure: RIGHT TOTAL HIP ARTHROPLASTY;  Surgeon: Desi Cali MD;  Location: [facility] OR     SOFT TISSUE SURGERY      skin graft at  age 9     VASCULAR SURGERY      vein stripping       Social History   I have reviewed this patient's social history and updated it with pertinent information if needed.  Social History     Tobacco Use     Smoking status: Former Smoker     Smokeless tobacco: Never Used   Substance Use Topics     Alcohol use: Yes     Comment: 2WINE,4 SHOTS OF LIQUOR/ WEEK     Drug use: Not on file       Family History     Mother with GERD, Alzheimer's  No known family history of GI ulcer    Prior to Admission Medications   Prior to Admission Medications   Prescriptions Last Dose Informant Patient Reported? Taking?   B Complex Vitamins (B COMPLEX PO)  Self Yes No   Sig: Take 1 tablet by mouth daily    Omega-3 Fatty Acids (OMEGA 3 PO)  Self Yes No   Sig: Take 1 Dose by mouth daily   VITAMIN D PO  Self Yes No   Sig: Take 1 Dose by mouth daily   VITAMIN E PO  Self Yes No   Sig: Take 1 tablet by mouth daily   acetaminophen (TYLENOL) 325 MG tablet   No No   Sig: Take 2 tablets (650 mg) by mouth every 4 hours as needed for other (mild pain)   aspirin (ASA) 325 MG EC tablet   No No   Sig: Take 1 tablet (325 mg) by mouth daily   finasteride (PROPECIA) 1 MG tablet  Self Yes No   Sig: Take 1 mg by mouth daily   hydrOXYzine (ATARAX) 10 MG tablet   No No   Sig: Take 1 tablet (10 mg) by mouth every 6 hours as needed for itching or anxiety (with pain, moderate pain)   oxyCODONE (ROXICODONE) 5 MG tablet   No No   Sig: Take 1-2 tablets (5-10 mg) by mouth every 3 hours as needed for pain (Moderate to Severe)   rosuvastatin (CRESTOR) 10 MG tablet  Self Yes No   Sig: Take 10 mg by mouth daily   senna-docusate (SENOKOT-S/PERICOLACE) 8.6-50 MG tablet   No No   Sig: Take 1-2 tablets by mouth 2 times daily Take while on oral narcotics to prevent or treat constipation.   sildenafil (REVATIO) 20 MG tablet  Self Yes No   Sig: Take 20 mg by mouth daily as needed      Facility-Administered Medications: None     Allergies   No Known Allergies    Physical Exam    Vital Signs: Temp: 97.6  F (36.4  C) Temp src: Temporal BP: 120/77 Pulse: 95   Resp: 10 SpO2: 96 % O2 Device: None (Room air)    Weight: 198 lbs 0 oz    General Appearance: Well-appearing 71-year-old male in no acute distress.  He appears younger than stated age.  Eyes: No scleral icterus or injection  HEENT: Normocephalic, atraumatic other than evidence of prior skin tag over anterior neck, Mohs surgery from right nares  Respiratory: Breath sounds are clear bilateral to auscultation, no wheezes or crackles.  Cardiovascular: Tachycardia in the low 100 range, no murmur.  Regular rhythm  GI: Abdomen soft, nontender to palpation in all quadrants.  Unable to reproduce any discomfort associated with localized fluid collection over liver on CT.  Bowel sounds are present and normal active.  Lymph/Hematologic: No lower extremity edema  Genitourinary: Not examined  Skin: Normal tone and turgor, no pallor, no jaundice  Musculoskeletal: Muscular tone and bulk intact in all extremities.  Neurologic: Alert, conversant, appropriate conversation.  Mental status grossly intact.  Psychiatric: Very pleasant, normal affect    Data   Data reviewed today: I reviewed all medications, new labs and imaging results over the last 24 hours. I personally reviewed the chest CT image(s) showing No pulmonary embolism.  A small amount of simple fluid collection over the right superior aspect of the liver, significant gastric distention despite patient's history of very minimal oral intake over the day..    Recent Labs   Lab 08/24/21  2307   WBC 10.9   HGB 12.7*   MCV 96         POTASSIUM 3.8   CHLORIDE 102   CO2 29   BUN 11   CR 0.97   ANIONGAP 6   JONI 9.9   *   ALBUMIN 3.8   PROTTOTAL 7.6   BILITOTAL 0.8   ALKPHOS 70   ALT 22   AST 16   LIPASE 91   TROPONIN <0.015

## 2021-08-25 NOTE — PROGRESS NOTES
RECEIVING UNIT ED HANDOFF REVIEW    ED Nurse Handoff Report was reviewed by: Erica Hobson RN on August 25, 2021 at 8:49 AM

## 2021-08-25 NOTE — ED PROVIDER NOTES
History     Chief Complaint:  Chest pain     HPI   Juan Morrissey is a 71 year old male with no history of cardiovascular disease who presents with concern of chest and abdominal pain.  About 24 hours ago overnight the patient awoke from his sleep with pain in his upper mid abdomen extending into his chest.  This has persisted since then.  He denies any distinct exacerbating or relieving factors.  He did exercise riding 15 miles on his bike several days ago and that did not produce any chest discomfort.  He is felt mildly nauseated but did not vomit.  He feels short of breath just walking.  He has felt somewhat lightheaded.  Denies heart palpitations.  Denies radiation of the pain into his upper back upper extremities or jaw.  He states his abdomen feels a bit bloated.  Denies hemoptysis but he has had a bit of cough.  He has been vaccinated for COVID-19.  He was admitted about a month ago at [facility] due to upper GI bleeding which was attributed to possibly overuse of NSAIDs.  He did undergo upper endoscopy then which showed some degree of erosive esophagitis but was discharged without incident and has been doing well.  He states the bloody stools have since resolved.    Allergies:  No Known Allergies     Medications:    Propecia  Atarax  Oxycodone  Crestor  Senna-docusate  Revatio  Vitamin D    Past Medical History:    Arthritis  DJD  Elevated HDL  GERD  Hyperlipidemia  Melanoma  Psoriasis  Rosacea    Past Surgical History:    Right total hip replacement  Skin graft  Vein stripping  Upper gastrointestinal endoscopy    Family History:    A sibling with coronary artery disease and PCI.    Social History:   reports that he has quit smoking. He has never used smokeless tobacco. He reports current alcohol use.    Denies alcohol abuse.  PCP: Teodoro Reyna     Review of Systems  All systems otherwise negative or per history of present illness.    Physical Exam     Patient Vitals for the past 24 hrs:   BP  Temp Temp src Pulse Resp SpO2 Weight   08/25/21 0100 -- -- -- 95 10 96 % --   08/25/21 0030 -- -- -- 100 16 97 % --   08/25/21 0000 -- -- -- 106 13 95 % --   08/24/21 2330 -- -- -- 112 14 95 % --   08/24/21 2303 120/77 97.6  F (36.4  C) Temporal 120 17 96 % 89.8 kg (198 lb)        Physical Exam  SKIN:  Warm, dry.  No jaundice.  HEMATOLOGIC/IMMUNOLOGIC/LYMPHATIC:  No pallor.  No lower extremity edema.  HENT: No jugular venous distention.  EYES:  Conjunctivae normal.  Anicteric.  CARDIOVASCULAR: Tachycardic rate with regular rhythm.  No murmur or rub.   RESPIRATORY:  No respiratory distress, breath sounds equal and normal.  GASTROINTESTINAL:  Soft, nontender abdomen with active bowel sounds.  No distention.  MUSCULOSKELETAL: Normal body habitus.  NEUROLOGIC:  Alert, conversant.  PSYCHIATRIC:  Normal mood.    Emergency Department Course   ECG  ECG taken at 2307, ECG read at 2310  Sinus tachycardia  ST & T wave abnormality, consider lateral ischemia   No significant changes as compared to prior, dated 02/05/21.  Rate 117 bpm. CT interval 158 ms. QRS duration 76 ms. QT/QTc 326/454 ms. P-R-T axes 56 77 15.     ECG  ECG taken at 0247, ECG read at 0249  Sinus tachycardia   Nonspecific T wave abnormality   No significant changes as compared to prior, dated 08/24/21.  Rate 106 bpm. CT interval 164 ms. QRS duration 78 ms. QT/QTc 334/443 ms. P-R-T axes 49 69 1.     Imaging:  CT Chest Pulmonary Embolism w Contrast  1.  Negative for pulmonary embolism. No acute findings in the chest.   2.  A small amount of free fluid in the right upper quadrant around the liver of uncertain significance or etiology.   3.  Nonspecific fluid distention of the stomach.  Per radiology    Laboratory:  Lipase: 91  CMP: Glucose 131 (H) o/w WNL (Creatinine 0.97)  Troponin (Collected 2307): <0.015  CBC: WBC 10.9, HGB 12.7 (L),    D dimer quantitative: 1.20 (H)   Asymptomatic COVID: Negative  Lactic acid (result time 0549) 1.1   Hemoglobin: 12.0  (L)  INR: 1.07  ABO RH Type and Screen: in process  Troponin (Collected 0529): <0.015     Procedures:  Nasogastric tube insertion returning dark blood.    Interventions:  0232  mg PO  0233 Nitrostat 0.4 mg Sublingual  0259 Xylocaine and Maalox 30 mL PO  0508 Xylocaine     Emergency Department Course:  Past medical records, nursing notes, and vitals reviewed.  I performed an exam of the patient and obtained history, as documented above.  I rechecked the patient. Findings and plan explained to the Patient and his wife.  Patient admitted to the hospital.    Impression & Plan      Medical Decision Making:  This patient presented with chest and epigastric pain.  EKG on arrival was concerning with subtle ST depression which raised concern for a cardiac source of his symptoms.  However troponin proved negative and nitroglycerin was not helpful regarding symptomatic control.  Repeat EKG was performed after the nitroglycerin was administered which actually appeared somewhat improved but not markedly changed.  Possibly the ST segment changes were a function of his tachycardia.  D-dimer performed with concern of pulmonary embolism given his EKG findings and exertional dyspnea which prompted CT chest.  No pulmonary embolism or thoracic pathology however the intra-abdominal component captured on the CT scan revealed fluid around the liver and within the stomach itself.  So the admitting physician Dr. Galarza requested a nasogastric tube insertion which did return dark blood.  So likely suffering upper GI bleed again similar to a month ago.  Gladly the patient is hemodynamically stable.  He will be admitted for further monitoring and consultation/endoscopy.    Diagnosis:    ICD-10-CM    1. Chest pain, unspecified type  R07.9    2. Gastrointestinal hemorrhage, unspecified gastrointestinal hemorrhage type  K92.2              8/24/2021   Shade Guzman MD Moe, James Thomas, MD  08/25/21 0624

## 2021-08-25 NOTE — ED NOTES
[facility]  ED Nurse Handoff Report    ED Chief complaint: Abdominal Pain      ED Diagnosis:   Final diagnoses:   None       Code Status: Full Code---- according to previous visit    Allergies: No Known Allergies    Patient Story: hospitalized 1 month ago for GI bleeding. States he continues to have abdominal pain with nausea and bloating.  Focused Assessment:  C/o abdominal fullness and bloating with discomfort into chest. Has occasional nausea.     Treatments and/or interventions provided: IV, labs, ekg, CT chest, ntg, asa, GI cocktail  Patient's response to treatments and/or interventions: pain continues    To be done/followed up on inpatient unit:  monitor    Does this patient have any cognitive concerns?: none    Activity level - Baseline/Home:  Independent  Activity Level - Current:   Independent    Patient's Preferred language: English   Needed?: No    Isolation: None  Infection: Not Applicable  Patient tested for COVID 19 prior to admission: YES  Bariatric?: No    Vital Signs:   Vitals:    08/24/21 2330 08/25/21 0000 08/25/21 0030 08/25/21 0100   BP:       Pulse: 112 106 100 95   Resp: 14 13 16 10   Temp:       TempSrc:       SpO2: 95% 95% 97% 96%   Weight:           Cardiac Rhythm:Cardiac Rhythm: Sinus tachycardia    Was the PSS-3 completed:   Yes  What interventions are required if any?               Family Comments: none present  OBS brochure/video discussed/provided to patient/family: No              Name of person given brochure if not patient:               Relationship to patient:     For the majority of the shift this patient's behavior was Green.   Behavioral interventions performed were .    ED NURSE PHONE NUMBER: [phone]

## 2021-08-25 NOTE — UTILIZATION REVIEW
Admission Status; Secondary Review Determination     Admission Date: 8/24/2021 11:12 PM       Under the authority of the Utilization Management Committee, the utilization review process indicated a secondary review on the above patient.  The review outcome is based on review of the medical records, discussions with staff, and applying clinical experience noted on the date of the review.        (x)      Inpatient Status Appropriate - This patient's medical care is consistent with medical management for inpatient care and reasonable inpatient medical practice.       RATIONALE FOR DETERMINATION      Brief clinical presentation, information copied from the chart, abbreviated and edited for relevant content:     Juan Morrissey is a 71 year old male admitted on 8/24/2021 with 24 hours of abdominal bloating and discomfort, acid reflux-like central chest discomfort.  Recent history of melenic stool x4 days and endoscopy without clear upper GI source of bleed with hospitalization at [place]; did have esophagitis at that time, though not thought to be source of bleed.  Now team to Consider Dieulafoy lesion of the stomach which might result in blood in stomach and distended gastric body as well as patient's current sinus tachycardia.  Acute upper GI hemorrhage - NG placed with approximately 600 milliliters blood return, patient's symptoms of esophagitis resolved, abdominal discomfort improved.  Given relatively normal EGD at the end of July for melenic stool, question if this represents dieulafoy lesion with brisk intermittent bleeding. Patient Today now NPO, gastroenterology consulted, starting Protonix bolus and drip, removing NG tube. Also with Anemia: Improving blood loss anemia from recent upper GI bleed, thought secondary to small bowel bleed.  Hospitalized at [facility] with lovely hemoglobin in the 7 range, 4 days of melenic stool.  Did not require blood transfusion during that hospitalization.Repeat CBC to  ensure stable.   Trend CBC every 6 hours.  Type and screen sent.       At the time of admission with the information available to the attending physician, more than 2 nights hospital complex care was anticipated. Also, there was a risk of adverse outcome if patient was treated outpatient or observation. High intensity of services anticipated. Inpatient admission appropriate based on Medicare guidelines.       The information on this document is developed by the utilization review team in order for the business office to ensure compliance.  This only denotes the appropriateness of proper admission status and does not reflect the quality of care rendered.         The definitions of Inpatient Status and Observation Status used in making the determination above are those provided in the CMS Coverage Manual, Chapter 1 and Chapter 6, section 70.4.      Sincerely,      Chelsey Josue MD   Utilization Review/ Case Management  [facility].

## 2021-08-25 NOTE — PROGRESS NOTES
Follow up PN     Patient seen and interviewed    GI consult noted    Reflux sxs resolved and less abd bloating    Hemodynamically stable and not tachycardic    Plan for EGD with push enteroscopy in AM      Jean-Claude Butler MD

## 2021-08-26 ENCOUNTER — ANESTHESIA (OUTPATIENT)
Dept: GASTROENTEROLOGY | Facility: CLINIC | Age: 71
End: 2021-08-26
Payer: COMMERCIAL

## 2021-08-26 VITALS
WEIGHT: 199 LBS | SYSTOLIC BLOOD PRESSURE: 89 MMHG | DIASTOLIC BLOOD PRESSURE: 56 MMHG | HEART RATE: 61 BPM | TEMPERATURE: 97.8 F | RESPIRATION RATE: 15 BRPM | BODY MASS INDEX: 28.49 KG/M2 | HEIGHT: 70 IN | OXYGEN SATURATION: 95 %

## 2021-08-26 LAB
ALBUMIN SERPL-MCNC: 3.3 G/DL (ref 3.4–5)
ALP SERPL-CCNC: 55 U/L (ref 40–150)
ALT SERPL W P-5'-P-CCNC: 17 U/L (ref 0–70)
ANION GAP SERPL CALCULATED.3IONS-SCNC: 4 MMOL/L (ref 3–14)
AST SERPL W P-5'-P-CCNC: 16 U/L (ref 0–45)
BILIRUB SERPL-MCNC: 1 MG/DL (ref 0.2–1.3)
BUN SERPL-MCNC: 10 MG/DL (ref 7–30)
CALCIUM SERPL-MCNC: 8.7 MG/DL (ref 8.5–10.1)
CHLORIDE BLD-SCNC: 106 MMOL/L (ref 94–109)
CO2 SERPL-SCNC: 28 MMOL/L (ref 20–32)
CREAT SERPL-MCNC: 1.2 MG/DL (ref 0.66–1.25)
ERYTHROCYTE [DISTWIDTH] IN BLOOD BY AUTOMATED COUNT: 15.3 % (ref 10–15)
ERYTHROCYTE [DISTWIDTH] IN BLOOD BY AUTOMATED COUNT: 15.4 % (ref 10–15)
GFR SERPL CREATININE-BSD FRML MDRD: 61 ML/MIN/1.73M2
GLUCOSE BLD-MCNC: 96 MG/DL (ref 70–99)
HCT VFR BLD AUTO: 33.3 % (ref 40–53)
HCT VFR BLD AUTO: 37.7 % (ref 40–53)
HGB BLD-MCNC: 10.3 G/DL (ref 13.3–17.7)
HGB BLD-MCNC: 11.6 G/DL (ref 13.3–17.7)
MCH RBC QN AUTO: 30.1 PG (ref 26.5–33)
MCH RBC QN AUTO: 30.4 PG (ref 26.5–33)
MCHC RBC AUTO-ENTMCNC: 30.8 G/DL (ref 31.5–36.5)
MCHC RBC AUTO-ENTMCNC: 30.9 G/DL (ref 31.5–36.5)
MCV RBC AUTO: 98 FL (ref 78–100)
MCV RBC AUTO: 98 FL (ref 78–100)
PLATELET # BLD AUTO: 254 10E3/UL (ref 150–450)
PLATELET # BLD AUTO: 280 10E3/UL (ref 150–450)
POTASSIUM BLD-SCNC: 4.2 MMOL/L (ref 3.4–5.3)
PROT SERPL-MCNC: 6.5 G/DL (ref 6.8–8.8)
PROVATION GI EXAM: NORMAL
RBC # BLD AUTO: 3.39 10E6/UL (ref 4.4–5.9)
RBC # BLD AUTO: 3.85 10E6/UL (ref 4.4–5.9)
SODIUM SERPL-SCNC: 138 MMOL/L (ref 133–144)
WBC # BLD AUTO: 8.1 10E3/UL (ref 4–11)
WBC # BLD AUTO: 9 10E3/UL (ref 4–11)

## 2021-08-26 PROCEDURE — 370N000017 HC ANESTHESIA TECHNICAL FEE, PER MIN: Performed by: INTERNAL MEDICINE

## 2021-08-26 PROCEDURE — 250N000009 HC RX 250: Performed by: NURSE ANESTHETIST, CERTIFIED REGISTERED

## 2021-08-26 PROCEDURE — 85027 COMPLETE CBC AUTOMATED: CPT | Performed by: INTERNAL MEDICINE

## 2021-08-26 PROCEDURE — 99239 HOSP IP/OBS DSCHRG MGMT >30: CPT | Performed by: HOSPITALIST

## 2021-08-26 PROCEDURE — G0378 HOSPITAL OBSERVATION PER HR: HCPCS

## 2021-08-26 PROCEDURE — 85014 HEMATOCRIT: CPT | Performed by: INTERNAL MEDICINE

## 2021-08-26 PROCEDURE — 258N000003 HC RX IP 258 OP 636: Performed by: INTERNAL MEDICINE

## 2021-08-26 PROCEDURE — C9113 INJ PANTOPRAZOLE SODIUM, VIA: HCPCS | Performed by: INTERNAL MEDICINE

## 2021-08-26 PROCEDURE — 44360 SMALL BOWEL ENDOSCOPY: CPT | Performed by: INTERNAL MEDICINE

## 2021-08-26 PROCEDURE — 999N000010 HC STATISTIC ANES STAT CODE-CRNA PER MINUTE: Performed by: INTERNAL MEDICINE

## 2021-08-26 PROCEDURE — 96361 HYDRATE IV INFUSION ADD-ON: CPT | Mod: 59

## 2021-08-26 PROCEDURE — 80053 COMPREHEN METABOLIC PANEL: CPT | Performed by: INTERNAL MEDICINE

## 2021-08-26 PROCEDURE — 250N000011 HC RX IP 250 OP 636: Performed by: INTERNAL MEDICINE

## 2021-08-26 PROCEDURE — 250N000011 HC RX IP 250 OP 636: Performed by: NURSE ANESTHETIST, CERTIFIED REGISTERED

## 2021-08-26 PROCEDURE — 96366 THER/PROPH/DIAG IV INF ADDON: CPT

## 2021-08-26 PROCEDURE — 36415 COLL VENOUS BLD VENIPUNCTURE: CPT | Performed by: INTERNAL MEDICINE

## 2021-08-26 RX ORDER — HYDROXYZINE HYDROCHLORIDE 10 MG/1
10 TABLET, FILM COATED ORAL EVERY 6 HOURS PRN
Status: DISCONTINUED | OUTPATIENT
Start: 2021-08-26 | End: 2021-08-26 | Stop reason: HOSPADM

## 2021-08-26 RX ORDER — PROPOFOL 10 MG/ML
INJECTION, EMULSION INTRAVENOUS CONTINUOUS PRN
Status: DISCONTINUED | OUTPATIENT
Start: 2021-08-26 | End: 2021-08-26

## 2021-08-26 RX ORDER — PANTOPRAZOLE SODIUM 40 MG/1
40 TABLET, DELAYED RELEASE ORAL
Status: DISCONTINUED | OUTPATIENT
Start: 2021-08-26 | End: 2021-08-26 | Stop reason: HOSPADM

## 2021-08-26 RX ORDER — ONDANSETRON 2 MG/ML
4 INJECTION INTRAMUSCULAR; INTRAVENOUS EVERY 30 MIN PRN
Status: CANCELLED | OUTPATIENT
Start: 2021-08-26

## 2021-08-26 RX ORDER — PANTOPRAZOLE SODIUM 40 MG/1
40 TABLET, DELAYED RELEASE ORAL
Qty: 60 TABLET | Refills: 0 | Status: SHIPPED | OUTPATIENT
Start: 2021-08-26

## 2021-08-26 RX ORDER — FLUMAZENIL 0.1 MG/ML
0.2 INJECTION, SOLUTION INTRAVENOUS
Status: CANCELLED | OUTPATIENT
Start: 2021-08-26 | End: 2021-08-26

## 2021-08-26 RX ORDER — FENTANYL CITRATE 50 UG/ML
25 INJECTION, SOLUTION INTRAMUSCULAR; INTRAVENOUS EVERY 5 MIN PRN
Status: CANCELLED | OUTPATIENT
Start: 2021-08-26

## 2021-08-26 RX ORDER — MEPERIDINE HYDROCHLORIDE 25 MG/ML
12.5 INJECTION INTRAMUSCULAR; INTRAVENOUS; SUBCUTANEOUS
Status: CANCELLED | OUTPATIENT
Start: 2021-08-26

## 2021-08-26 RX ORDER — LIDOCAINE HYDROCHLORIDE 20 MG/ML
INJECTION, SOLUTION INFILTRATION; PERINEURAL PRN
Status: DISCONTINUED | OUTPATIENT
Start: 2021-08-26 | End: 2021-08-26

## 2021-08-26 RX ORDER — PROPOFOL 10 MG/ML
INJECTION, EMULSION INTRAVENOUS PRN
Status: DISCONTINUED | OUTPATIENT
Start: 2021-08-26 | End: 2021-08-26

## 2021-08-26 RX ORDER — OXYCODONE HYDROCHLORIDE 5 MG/1
5 TABLET ORAL EVERY 4 HOURS PRN
Status: CANCELLED | OUTPATIENT
Start: 2021-08-26

## 2021-08-26 RX ORDER — SODIUM CHLORIDE, SODIUM LACTATE, POTASSIUM CHLORIDE, CALCIUM CHLORIDE 600; 310; 30; 20 MG/100ML; MG/100ML; MG/100ML; MG/100ML
INJECTION, SOLUTION INTRAVENOUS CONTINUOUS
Status: CANCELLED | OUTPATIENT
Start: 2021-08-26

## 2021-08-26 RX ORDER — HYDROMORPHONE HYDROCHLORIDE 1 MG/ML
0.2 INJECTION, SOLUTION INTRAMUSCULAR; INTRAVENOUS; SUBCUTANEOUS EVERY 5 MIN PRN
Status: CANCELLED | OUTPATIENT
Start: 2021-08-26

## 2021-08-26 RX ORDER — LIDOCAINE 40 MG/G
CREAM TOPICAL
Status: CANCELLED | OUTPATIENT
Start: 2021-08-26

## 2021-08-26 RX ORDER — ONDANSETRON 2 MG/ML
INJECTION INTRAMUSCULAR; INTRAVENOUS PRN
Status: DISCONTINUED | OUTPATIENT
Start: 2021-08-26 | End: 2021-08-26

## 2021-08-26 RX ORDER — ONDANSETRON 4 MG/1
4 TABLET, ORALLY DISINTEGRATING ORAL EVERY 30 MIN PRN
Status: CANCELLED | OUTPATIENT
Start: 2021-08-26

## 2021-08-26 RX ORDER — ROSUVASTATIN CALCIUM 10 MG/1
10 TABLET, COATED ORAL DAILY
Status: DISCONTINUED | OUTPATIENT
Start: 2021-08-26 | End: 2021-08-26 | Stop reason: HOSPADM

## 2021-08-26 RX ADMIN — ONDANSETRON 4 MG: 2 INJECTION INTRAMUSCULAR; INTRAVENOUS at 10:28

## 2021-08-26 RX ADMIN — Medication 8 MCG: at 10:37

## 2021-08-26 RX ADMIN — PROPOFOL 50 MG: 10 INJECTION, EMULSION INTRAVENOUS at 10:27

## 2021-08-26 RX ADMIN — SODIUM CHLORIDE, POTASSIUM CHLORIDE, SODIUM LACTATE AND CALCIUM CHLORIDE: 600; 310; 30; 20 INJECTION, SOLUTION INTRAVENOUS at 05:08

## 2021-08-26 RX ADMIN — PROPOFOL 50 MG: 10 INJECTION, EMULSION INTRAVENOUS at 10:30

## 2021-08-26 RX ADMIN — PROPOFOL 150 MCG/KG/MIN: 10 INJECTION, EMULSION INTRAVENOUS at 10:26

## 2021-08-26 RX ADMIN — Medication 12 MCG: at 10:28

## 2021-08-26 RX ADMIN — LIDOCAINE HYDROCHLORIDE 60 MG: 20 INJECTION, SOLUTION INFILTRATION; PERINEURAL at 10:25

## 2021-08-26 RX ADMIN — PROPOFOL 20 MG: 10 INJECTION, EMULSION INTRAVENOUS at 10:38

## 2021-08-26 RX ADMIN — SODIUM CHLORIDE 8 MG/HR: 9 INJECTION, SOLUTION INTRAVENOUS at 01:07

## 2021-08-26 RX ADMIN — SODIUM CHLORIDE, POTASSIUM CHLORIDE, SODIUM LACTATE AND CALCIUM CHLORIDE: 600; 310; 30; 20 INJECTION, SOLUTION INTRAVENOUS at 10:26

## 2021-08-26 ASSESSMENT — ACTIVITIES OF DAILY LIVING (ADL)
ADLS_ACUITY_SCORE: 19
ADLS_ACUITY_SCORE: 19
ADLS_ACUITY_SCORE: 17
ADLS_ACUITY_SCORE: 17

## 2021-08-26 ASSESSMENT — MIFFLIN-ST. JEOR: SCORE: 1673.91

## 2021-08-26 NOTE — PLAN OF CARE
Pt A x O x 4. Pt reports some improvement in abdominal discomfort/fullness. Oxygen saturation >90% on RA. SR on tele. IVF and protonix gtt infusing. Ambulates independently in room. NPO at midnight. Plan for EGD/enteroscopy in AM. Pt educated on poc, cares, medications and safety. Will continue to monitor.

## 2021-08-26 NOTE — DISCHARGE SUMMARY
[facility]  Hospitalist Discharge Summary      Date of Admission:  8/24/2021  Date of Discharge:  8/26/2021  Discharging Provider: Chuck Benítez DO      Discharge Diagnoses   Esophageal ulcer with GI bleeding    Follow-ups Needed After Discharge   Follow-up with [facility] and PCP    Unresulted Labs Ordered in the Past 30 Days of this Admission     No orders found from 7/25/2021 to 8/25/2021.      These results will be followed up by none    Discharge Disposition   Discharged to home  Condition at discharge: Stable      Hospital Course   GI bleeding from eosphageal ulceration  benign esophageal stenosis  P/w abdominal bloating and acidic discomfort in the setting of recent admission for melantotic stools notable for grade B esophagitis, but no obvious bleeding source at outside hospital  Now returns with gastric distention found to be blood after NGT placement  EGD by GI revealed nonbleeding esophageal ulceration  Plan  - tolerated diet without symptoms after EGD  - GI okayed for discharge this evening  - PPI on discharge  - follow-up with [facility] on discharge    Anemia  Plan  - stable to increasing hemoglobin    EKG changes, transient  Chest discomfort most certainly related to his gastric distention given his resolution after NGT decompression, lack of improvement with nitroglycerin, negative serial troponins, etc  Plan  - can follow-up with PCP and consider outpatient stress testing if further concern for ischemic chest pain becomes apparent        Consultations This Hospital Stay   GASTROENTEROLOGY IP CONSULT    Code Status   Full Code    Time Spent on this Encounter   I, Chuck Benítez DO, personally saw the patient today and spent greater than 30 minutes discharging this patient.       DO [facility]  [address] [place] [address] [address]VE[address] [address]  Phone:  [phone]  ______________________________________________________________________    Physical Exam   Vital Signs: Temp: 97.8  F (36.6  C) Temp src: Oral BP: (!) 89/56 Pulse: 61   Resp: 15 SpO2: 95 % O2 Device: None (Room air)    Weight: 199 lbs 0 oz     General Appearance: Well-appearing 71-year-old male in no acute distress.  He appears younger than stated age.  Eyes: No scleral icterus or injection  HEENT: Normocephalic, atraumatic other than evidence of prior skin tag over anterior neck, Mohs surgery from right nares  Respiratory: Breath sounds are clear bilateral to auscultation, no wheezes or crackles.  Cardiovascular: RRR, no murmurs. PMI not displaced  Lymph/Hematologic: No lower extremity edema  Genitourinary: Not examined  Skin: Normal tone and turgor, no pallor, no jaundice  Musculoskeletal: Muscular tone and bulk intact in all extremities.  Neurologic: Alert, conversant, appropriate conversation.  Mental status grossly intact.  Psychiatric: Very pleasant, normal affect          Primary Care Physician   Teodoro Reyna    Discharge Orders      Reason for your hospital stay    Bleeding from esophageal ulcer     Follow-up and recommended labs and tests     Follow up with primary care provider, Teodoro Reyna, within 7 days to evaluate medication change and for hospital follow- up.  The following labs/tests are recommended: complete blood count.      Follow-up with [facility], first available     Activity    Your activity upon discharge: activity as tolerated     Diet    Follow this diet upon discharge: Orders Placed This Encounter      Regular Diet Adult       Significant Results and Procedures   Most Recent 3 CBC's:Recent Labs   Lab Test 08/26/21  0537 08/26/21  0033 08/25/21  1756   WBC 9.0 8.1 9.6   HGB 11.6* 10.3* 10.7*   MCV 98 98 97    254 273     Most Recent 3 BMP's:Recent Labs   Lab Test 08/26/21  0537 08/24/21  2307    137   POTASSIUM 4.2 3.8   CHLORIDE 106 102   CO2 28 29   BUN 10 11   CR 1.20  0.97   ANIONGAP 4 6   JONI 8.7 9.9   GLC 96 131*     Most Recent 2 LFT's:Recent Labs   Lab Test 08/26/21  0537 08/24/21 2307   AST 16 16   ALT 17 22   ALKPHOS 55 70   BILITOTAL 1.0 0.8     Most Recent 3 INR's:Recent Labs   Lab Test 08/24/21 2307   INR 1.07   ,   Results for orders placed or performed during the hospital encounter of 08/24/21   CT Chest Pulmonary Embolism w Contrast    Narrative    EXAM: CT CHEST PULMONARY EMBOLISM W CONTRAST  LOCATION: [facility]  DATE/TIME: 8/25/2021 12:33 AM    INDICATION: Chest pain.    COMPARISON: None.    TECHNIQUE: CT chest pulmonary angiogram during arterial phase injection of IV contrast. Multiplanar reformats and MIP reconstructions were performed. Dose reduction techniques were used.     CONTRAST: 72 mL [id number].    FINDINGS:  ANGIOGRAM CHEST: Pulmonary arteries are normal caliber and negative for pulmonary emboli. Ascending thoracic aorta is negative for dissection. Descending thoracic aorta is not opacified well enough to evaluate for dissection. Negative for aneurysm. No CT   evidence of right heart strain.    LUNGS AND PLEURA: Slight scattered atelectasis in the lungs. No acute-appearing infiltrates or consolidation. Central airways are clear. No pleural effusions.    MEDIASTINUM/AXILLAE: Normal.    CORONARY ARTERY CALCIFICATION: Moderate.    UPPER ABDOMEN: Nonspecific fluid distention of the stomach. Small amount of free fluid in the right upper quadrant around the liver.    MUSCULOSKELETAL: Normal.      Impression    IMPRESSION:  1.  Negative for pulmonary embolism. No acute findings in the chest.    2.  A small amount of free fluid in the right upper quadrant around the liver of uncertain significance or etiology.    3.  Nonspecific fluid distention of the stomach.       Discharge Medications   Current Discharge Medication List      START taking these medications    Details   pantoprazole (PROTONIX) 40 MG EC tablet Take 1 tablet (40 mg)  by mouth 2 times daily (before meals)  Qty: 60 tablet, Refills: 0    Associated Diagnoses: Gastrointestinal hemorrhage, unspecified gastrointestinal hemorrhage type         CONTINUE these medications which have NOT CHANGED    Details   B Complex Vitamins (B COMPLEX PO) Take 1 tablet by mouth daily       ferrous sulfate (FEROSUL) 325 (65 Fe) MG tablet Take 325 mg by mouth 3 times daily (with meals)      finasteride (PROPECIA) 1 MG tablet Take 1 mg by mouth daily      hydrOXYzine (ATARAX) 10 MG tablet Take 1 tablet (10 mg) by mouth every 6 hours as needed for itching or anxiety (with pain, moderate pain)  Qty: 30 tablet, Refills: 0    Associated Diagnoses: History of total right hip replacement      Omega-3 Fatty Acids (OMEGA 3 PO) Take 1 Dose by mouth daily      rosuvastatin (CRESTOR) 10 MG tablet Take 10 mg by mouth daily      sildenafil (REVATIO) 20 MG tablet Take 20 mg by mouth daily as needed      vitamin C (ASCORBIC ACID) 500 MG tablet Take 500 mg by mouth every other day Takes with iron.      VITAMIN D PO Take 1 Dose by mouth daily      VITAMIN E PO Take 1 tablet by mouth daily           Allergies   No Known Allergies

## 2021-08-26 NOTE — PLAN OF CARE
VSS. Tele; SR EGD and enteroscopy completed. No signs of bleeding. IV out and monitor off. Medications filled here and sent home. Reviewed discharge instructions all questions and concerns addressed. Pt states he has all of his belongings. Pt walked down to door 6 where his wife picked him up.

## 2021-08-26 NOTE — ANESTHESIA CARE TRANSFER NOTE
Patient: Juan Morrissey    Procedure(s):  ENTEROSCOPY    Diagnosis: GI bleed [K92.2]  Diagnosis Additional Information: No value filed.    Anesthesia Type:   MAC     Note:    Oropharynx: oropharynx clear of all foreign objects  Level of Consciousness: awake and drowsy              Patient transferred to: PACU  Comments: At end of procedure, spontaneous respirations, patient alert to voice, able to follow commands. Oxygen via facemask at 6 liters per minute to PACU. Oxygen tubing connected to wall O2 in PACU, SpO2, NiBP, and EKG monitors and alarms on and functioning, Moises Hugger warmer connected to patient gown, report on patient's clinical status given to PACU RN, RN questions answered.  Handoff Report: Identifed the Patient, Identified the Reponsible Provider, Reviewed the pertinent medical history, Discussed the surgical course, Reviewed Intra-OP anesthesia mangement and issues during anesthesia, Set expectations for post-procedure period and Allowed opportunity for questions and acknowledgement of understanding      Vitals:  Vitals Value Taken Time   BP 85/66 08/26/21 1050   Temp     Pulse 60 08/26/21 1051   Resp 16 08/26/21 1051   SpO2 100 % 08/26/21 1051   Vitals shown include unvalidated device data.    Electronically Signed By: PAPI Ang CRNA  August 26, 2021  10:52 AM

## 2021-08-26 NOTE — PROGRESS NOTES
GI    EGD/enteroscopy performed today, though it appears the endoscopy software program has not sent the completed note to the Epic chart properly.      Findings included a nonbleeding linear esophageal ulcer, 5 mm, associated with a benign intrinsic mild stenosis (Schatzki's ring).  There was no blood or mucosal lesion seen in the stomach, and a single 6 mm submucosal nodule was found in the duodenal bulb.  The scope was advanced 60 cm beyond the pylorus and no additional blood or active bleeding or mucosal lesion was seen.    Suggest the patient be maintained on daily PPI given the history of erosive esophagitis and now esophageal ulcer.  His diet can be advanced and it would be reasonable for him to be discharged.  If there were recurrent bleeding in the future a tagged red cell scan versus CT angio would be appropriate    Kg Liriano DO   [facility]  Cell [phone]

## 2021-08-26 NOTE — ANESTHESIA PREPROCEDURE EVALUATION
Anesthesia Pre-Procedure Evaluation    Patient: Juan Morrissey   MRN: [id number] : 1950        Preoperative Diagnosis: GI bleed [K92.2]   Procedure : Procedure(s):  ENTEROSCOPY     Past Medical History:   Diagnosis Date     Arthritis      DJD (degenerative joint disease)      Elevated HDL      GERD (gastroesophageal reflux disease) 2017     Hyperlipidemia, mixed      Melanoma (H) 2017     Psoriasis      Rosacea       Past Surgical History:   Procedure Laterality Date     ARTHROPLASTY HIP Right 2021    Procedure: RIGHT TOTAL HIP ARTHROPLASTY;  Surgeon: Desi Cali MD;  Location: [facility] OR     SOFT TISSUE SURGERY      skin graft at age 9     VASCULAR SURGERY      vein stripping      No Known Allergies   Social History     Tobacco Use     Smoking status: Former Smoker     Smokeless tobacco: Never Used   Substance Use Topics     Alcohol use: Yes     Comment: 2WINE,4 SHOTS OF LIQUOR/ WEEK      Wt Readings from Last 1 Encounters:   21 89.3 kg (196 lb 12.8 oz)        Anesthesia Evaluation   Pt has had prior anesthetic. Type: General.    No history of anesthetic complications       ROS/MED HX  ENT/Pulmonary:       Neurologic:  - neg neurologic ROS     Cardiovascular:     (+) Dyslipidemia -----    METS/Exercise Tolerance:     Hematologic:       Musculoskeletal:   (+) arthritis,     GI/Hepatic: Comment: Admitted with abdominal bloating and chest pain. H/o esophagitis and GI bleed    (+) GERD,     Renal/Genitourinary:  - neg Renal ROS     Endo: Comment: Psoriasis      Psychiatric/Substance Use:  - neg psychiatric ROS     Infectious Disease:  - neg infectious disease ROS     Malignancy:   (+) Malignancy (Hx of melanoma), History of Skin.    Other:            Physical Exam    Airway        Mallampati: I   TM distance: > 3 FB   Neck ROM: full   Mouth opening: > 3 cm    Respiratory Devices and Support         Dental  no notable dental history         Cardiovascular             Pulmonary                    OUTSIDE LABS:  CBC:   Lab Results   Component Value Date    WBC 9.6 08/25/2021    WBC 9.8 08/25/2021    HGB 10.7 (L) 08/25/2021    HGB 10.8 (L) 08/25/2021    HCT 34.3 (L) 08/25/2021    HCT 34.6 (L) 08/25/2021     08/25/2021     08/25/2021     BMP:   Lab Results   Component Value Date     08/24/2021    POTASSIUM 3.8 08/24/2021    CHLORIDE 102 08/24/2021    CO2 29 08/24/2021    BUN 11 08/24/2021    CR 0.97 08/24/2021     (H) 08/24/2021     COAGS:   Lab Results   Component Value Date    INR 1.07 08/24/2021     POC:   Lab Results   Component Value Date     (H) 02/06/2021     HEPATIC:   Lab Results   Component Value Date    ALBUMIN 3.8 08/24/2021    PROTTOTAL 7.6 08/24/2021    ALT 22 08/24/2021    AST 16 08/24/2021    ALKPHOS 70 08/24/2021    BILITOTAL 0.8 08/24/2021     OTHER:   Lab Results   Component Value Date    LACT 1.1 08/25/2021    JONI 9.9 08/24/2021    LIPASE 91 08/24/2021       Anesthesia Plan    ASA Status:  3   NPO Status:  NPO Appropriate    Anesthesia Type: MAC.     - Reason for MAC: straight local not clinically adequate              Consents    Anesthesia Plan(s) and associated risks, benefits, and realistic alternatives discussed. Questions answered and patient/representative(s) expressed understanding.     - Discussed with:  Patient         Postoperative Care    Pain management: IV analgesics.   PONV prophylaxis: Ondansetron (or other 5HT-3)     Comments:                Marvel Hammond, DO, DO

## 2021-08-26 NOTE — ANESTHESIA POSTPROCEDURE EVALUATION
Patient: Juan Morrissey    Procedure(s):  ENTEROSCOPY    Diagnosis:GI bleed [K92.2]  Diagnosis Additional Information: No value filed.    Anesthesia Type:  MAC    Note:  Disposition: Outpatient   Postop Pain Control: Uneventful            Sign Out: Well controlled pain   PONV: No   Neuro/Psych: Uneventful            Sign Out: Acceptable/Baseline neuro status   Airway/Respiratory: Uneventful            Sign Out: Acceptable/Baseline resp. status   CV/Hemodynamics: Uneventful            Sign Out: Acceptable CV status; No obvious hypovolemia; No obvious fluid overload   Other NRE: NONE   DID A NON-ROUTINE EVENT OCCUR? No           Last vitals:  Vitals Value Taken Time   BP 89/56 08/26/21 1134   Temp 36.6  C (97.8  F) 08/26/21 1134   Pulse 61 08/26/21 1134   Resp 15 08/26/21 1134   SpO2 95 % 08/26/21 1134       Electronically Signed By: Marvel Hammond DO, DO  August 26, 2021  4:57 PM

## 2021-10-11 ENCOUNTER — HEALTH MAINTENANCE LETTER (OUTPATIENT)
Age: 71
End: 2021-10-11

## 2022-05-22 ENCOUNTER — HEALTH MAINTENANCE LETTER (OUTPATIENT)
Age: 72
End: 2022-05-22

## 2022-09-25 ENCOUNTER — HEALTH MAINTENANCE LETTER (OUTPATIENT)
Age: 72
End: 2022-09-25

## 2023-06-04 ENCOUNTER — HEALTH MAINTENANCE LETTER (OUTPATIENT)
Age: 73
End: 2023-06-04

## 2024-03-19 NOTE — ANESTHESIA POSTPROCEDURE EVALUATION
Patient: Juan Morrissey    Procedure(s):  RIGHT TOTAL HIP ARTHROPLASTY    Diagnosis:Primary osteoarthritis of right hip [M16.11]  Diagnosis Additional Information: No value filed.    Anesthesia Type:  General    Note:     Postop Pain Control: Uneventful            Sign Out: Well controlled pain   PONV: No   Neuro/Psych: Uneventful            Sign Out: Acceptable/Baseline neuro status   Airway/Respiratory: Uneventful            Sign Out: Acceptable/Baseline resp. status   CV/Hemodynamics: Uneventful            Sign Out: Acceptable CV status   Other NRE: NONE   DID A NON-ROUTINE EVENT OCCUR? No         Last vitals:  Vitals:    02/05/21 1050 02/05/21 1100 02/05/21 1110   BP: 116/72 120/71    Pulse: 63 64    Resp: 10 9 (P) 9   Temp:      SpO2: 99% 100%        Last vitals prior to Anesthesia Care Transfer:  CRNA VITALS  2/5/2021 0932 - 2/5/2021 1032      2/5/2021             Pulse:  103    SpO2:  96 %    Resp Rate (observed):  10          Electronically Signed By: Deven Ly MD  February 5, 2021  11:15 AM   None

## 2024-07-14 ENCOUNTER — HEALTH MAINTENANCE LETTER (OUTPATIENT)
Age: 74
End: 2024-07-14

## 2025-01-28 ENCOUNTER — LAB REQUISITION (OUTPATIENT)
Dept: LAB | Facility: CLINIC | Age: 75
End: 2025-01-28
Payer: COMMERCIAL

## 2025-01-28 DIAGNOSIS — D49.2 NEOPLASM OF UNSPECIFIED BEHAVIOR OF BONE, SOFT TISSUE, AND SKIN: ICD-10-CM

## 2025-01-28 PROCEDURE — 88305 TISSUE EXAM BY PATHOLOGIST: CPT | Mod: 26 | Performed by: DERMATOLOGY

## 2025-01-28 PROCEDURE — 88305 TISSUE EXAM BY PATHOLOGIST: CPT | Mod: TC,ORL | Performed by: DERMATOLOGY

## 2025-01-30 LAB
PATH REPORT.COMMENTS IMP SPEC: ABNORMAL
PATH REPORT.COMMENTS IMP SPEC: ABNORMAL
PATH REPORT.COMMENTS IMP SPEC: YES
PATH REPORT.FINAL DX SPEC: ABNORMAL
PATH REPORT.GROSS SPEC: ABNORMAL
PATH REPORT.MICROSCOPIC SPEC OTHER STN: ABNORMAL
PATH REPORT.RELEVANT HX SPEC: ABNORMAL

## (undated) DEVICE — LINEN TOWEL PACK X5 5464

## (undated) DEVICE — SU VICRYL 0 CT-1 27" J340H

## (undated) DEVICE — NDL 18GA 1.5" 305196

## (undated) DEVICE — CLOSURE SYS SKIN PREMIERPRO EXOFIN FUSION 4X22CM STRL 3472

## (undated) DEVICE — SOL NACL 0.9% IRRIG 3000ML BAG 2B7477

## (undated) DEVICE — DRSG XEROFORM 1X8"

## (undated) DEVICE — GOWN XXLG REINFORCED 9071EL

## (undated) DEVICE — MANIFOLD NEPTUNE 4 PORT 700-20

## (undated) DEVICE — PAD FOAM MCGUIRE KIT 0814-0150

## (undated) DEVICE — SOL WATER IRRIG 1000ML BOTTLE 2F7114

## (undated) DEVICE — SOLUTION WOUND CLEANSING 3/4OZ 10% PVP EA-L3011FB-50

## (undated) DEVICE — GLOVE PROTEXIS MICRO 8.0  2D73PM80

## (undated) DEVICE — BLADE SAW SAGITTAL STRK 18X90X1.27MM HD SYS 6 6118-127-090

## (undated) DEVICE — SU ETHIBOND 0 CTX CR  8X18" CX31D

## (undated) DEVICE — SU MONOCRYL 3-0 PS-2 27" Y427H

## (undated) DEVICE — SU VICRYL 2-0 CT-1 27" J339H

## (undated) DEVICE — SU STRATAFIX PDS PLUS 0 CT 45CM SXPP1A406

## (undated) DEVICE — BONE WAX 2.5GM W31G

## (undated) DEVICE — DRAPE IOBAN INCISE 23X17" 6650EZ

## (undated) DEVICE — IMM PILLOW ABDUCT HIP MED 31143061

## (undated) DEVICE — PREP CHLORAPREP 26ML TINTED ORANGE  260815

## (undated) DEVICE — PACK TOTAL HIP W/POUCH SOP15HPFSM

## (undated) DEVICE — GLOVE PROTEXIS BLUE W/NEU-THERA 8.0  2D73EB80

## (undated) DEVICE — SOL NACL 0.9% IRRIG 1000ML BOTTLE 2F7124

## (undated) DEVICE — SUCTION IRR SYSTEM W/O TIP INTERPULSE HANDPIECE 0210-100-000

## (undated) DEVICE — SU ETHIBOND 2 V-37 4X30" MX69G

## (undated) DEVICE — SYR 30ML LL W/O NDL 302832

## (undated) DEVICE — GLOVE PROTEXIS W/NEU-THERA 8.0  2D73TE80

## (undated) DEVICE — ESU GROUND PAD UNIVERSAL W/O CORD

## (undated) RX ORDER — HYDROMORPHONE HYDROCHLORIDE 1 MG/ML
INJECTION, SOLUTION INTRAMUSCULAR; INTRAVENOUS; SUBCUTANEOUS
Status: DISPENSED
Start: 2021-02-05

## (undated) RX ORDER — CEFAZOLIN SODIUM 1 G/3ML
INJECTION, POWDER, FOR SOLUTION INTRAMUSCULAR; INTRAVENOUS
Status: DISPENSED
Start: 2021-02-05

## (undated) RX ORDER — FENTANYL CITRATE 50 UG/ML
INJECTION, SOLUTION INTRAMUSCULAR; INTRAVENOUS
Status: DISPENSED
Start: 2021-02-05

## (undated) RX ORDER — HYDROXYZINE HYDROCHLORIDE 50 MG/ML
INJECTION, SOLUTION INTRAMUSCULAR
Status: DISPENSED
Start: 2021-02-05

## (undated) RX ORDER — ONDANSETRON 2 MG/ML
INJECTION INTRAMUSCULAR; INTRAVENOUS
Status: DISPENSED
Start: 2021-02-05

## (undated) RX ORDER — KETOROLAC TROMETHAMINE 30 MG/ML
INJECTION, SOLUTION INTRAMUSCULAR; INTRAVENOUS
Status: DISPENSED
Start: 2021-02-05

## (undated) RX ORDER — NEOSTIGMINE METHYLSULFATE 1 MG/ML
VIAL (ML) INJECTION
Status: DISPENSED
Start: 2021-02-05

## (undated) RX ORDER — LIDOCAINE HYDROCHLORIDE 20 MG/ML
INJECTION, SOLUTION EPIDURAL; INFILTRATION; INTRACAUDAL; PERINEURAL
Status: DISPENSED
Start: 2021-02-05

## (undated) RX ORDER — CEFAZOLIN SODIUM 2 G/100ML
INJECTION, SOLUTION INTRAVENOUS
Status: DISPENSED
Start: 2021-02-05

## (undated) RX ORDER — TRANEXAMIC ACID 650 MG/1
TABLET ORAL
Status: DISPENSED
Start: 2021-02-05

## (undated) RX ORDER — PROPOFOL 10 MG/ML
INJECTION, EMULSION INTRAVENOUS
Status: DISPENSED
Start: 2021-02-05

## (undated) RX ORDER — GLYCOPYRROLATE 0.2 MG/ML
INJECTION, SOLUTION INTRAMUSCULAR; INTRAVENOUS
Status: DISPENSED
Start: 2021-02-05

## (undated) RX ORDER — FENTANYL CITRATE 0.05 MG/ML
INJECTION, SOLUTION INTRAMUSCULAR; INTRAVENOUS
Status: DISPENSED
Start: 2021-02-05

## (undated) RX ORDER — DEXAMETHASONE SODIUM PHOSPHATE 4 MG/ML
INJECTION, SOLUTION INTRA-ARTICULAR; INTRALESIONAL; INTRAMUSCULAR; INTRAVENOUS; SOFT TISSUE
Status: DISPENSED
Start: 2021-02-05